# Patient Record
Sex: FEMALE | Race: WHITE | Employment: FULL TIME
[De-identification: names, ages, dates, MRNs, and addresses within clinical notes are randomized per-mention and may not be internally consistent; named-entity substitution may affect disease eponyms.]

---

## 2017-02-06 ENCOUNTER — OFFICE VISIT (OUTPATIENT)
Dept: FAMILY MEDICINE CLINIC | Facility: CLINIC | Age: 42
End: 2017-02-06

## 2017-02-06 ENCOUNTER — HOSPITAL ENCOUNTER (OUTPATIENT)
Age: 42
Setting detail: SPECIMEN
Discharge: HOME | End: 2017-02-06
Payer: MEDICARE

## 2017-02-06 VITALS
OXYGEN SATURATION: 100 % | RESPIRATION RATE: 18 BRPM | SYSTOLIC BLOOD PRESSURE: 110 MMHG | WEIGHT: 153 LBS | DIASTOLIC BLOOD PRESSURE: 72 MMHG | HEART RATE: 80 BPM | BODY MASS INDEX: 26.12 KG/M2 | TEMPERATURE: 98.2 F | HEIGHT: 64 IN

## 2017-02-06 DIAGNOSIS — Z82.61 FAMILY HISTORY OF RHEUMATOID ARTHRITIS: ICD-10-CM

## 2017-02-06 DIAGNOSIS — Z13.9 SCREENING: ICD-10-CM

## 2017-02-06 DIAGNOSIS — R76.8 ELEVATED RHEUMATOID FACTOR: ICD-10-CM

## 2017-02-06 DIAGNOSIS — M06.39 RHEUMATOID NODULE OF MULTIPLE SITES (HCC): ICD-10-CM

## 2017-02-06 DIAGNOSIS — M25.541 ARTHRALGIA OF BOTH HANDS: Primary | ICD-10-CM

## 2017-02-06 DIAGNOSIS — M25.542 ARTHRALGIA OF BOTH HANDS: Primary | ICD-10-CM

## 2017-02-06 LAB
ALBUMIN SERPL-MCNC: 4.2 G/DL (ref 3.5–5.2)
ALBUMIN/GLOBULIN RATIO: 1.2 (ref 1–2.5)
ALP BLD-CCNC: 47 U/L (ref 35–104)
ALT SERPL-CCNC: 86 U/L (ref 5–33)
AST SERPL-CCNC: 78 U/L
BILIRUB SERPL-MCNC: 0.3 MG/DL (ref 0.3–1.2)
BILIRUBIN DIRECT: 0.09 MG/DL
BILIRUBIN, INDIRECT: 0.21 MG/DL (ref 0–1)
CHOLESTEROL/HDL RATIO: 2.9
CHOLESTEROL: 170 MG/DL
GLOBULIN: ABNORMAL G/DL (ref 1.5–3.8)
HDLC SERPL-MCNC: 58 MG/DL
LDL CHOLESTEROL: 65 MG/DL (ref 0–130)
RHEUMATOID FACTOR: 76.1 IU/ML
TOTAL PROTEIN: 7.6 G/DL (ref 6.4–8.3)
TRIGL SERPL-MCNC: 236 MG/DL
VLDLC SERPL CALC-MCNC: ABNORMAL MG/DL (ref 1–30)

## 2017-02-06 PROCEDURE — 99214 OFFICE O/P EST MOD 30 MIN: CPT | Performed by: NURSE PRACTITIONER

## 2017-02-06 ASSESSMENT — PATIENT HEALTH QUESTIONNAIRE - PHQ9
SUM OF ALL RESPONSES TO PHQ QUESTIONS 1-9: 0
1. LITTLE INTEREST OR PLEASURE IN DOING THINGS: 0
2. FEELING DOWN, DEPRESSED OR HOPELESS: 0
SUM OF ALL RESPONSES TO PHQ9 QUESTIONS 1 & 2: 0

## 2017-02-06 ASSESSMENT — ENCOUNTER SYMPTOMS
RHINORRHEA: 0
EYE DISCHARGE: 0
SINUS PRESSURE: 0
CHEST TIGHTNESS: 0
SHORTNESS OF BREATH: 0
COUGH: 0
DIARRHEA: 0
BLOOD IN STOOL: 0
ABDOMINAL PAIN: 0
CONSTIPATION: 0

## 2017-02-13 ENCOUNTER — OFFICE VISIT (OUTPATIENT)
Dept: FAMILY MEDICINE CLINIC | Facility: CLINIC | Age: 42
End: 2017-02-13

## 2017-02-13 VITALS
RESPIRATION RATE: 20 BRPM | HEART RATE: 97 BPM | BODY MASS INDEX: 25.92 KG/M2 | TEMPERATURE: 98.3 F | WEIGHT: 151 LBS | SYSTOLIC BLOOD PRESSURE: 116 MMHG | DIASTOLIC BLOOD PRESSURE: 68 MMHG

## 2017-02-13 DIAGNOSIS — M06.39 RHEUMATOID NODULE OF MULTIPLE SITES (HCC): Primary | ICD-10-CM

## 2017-02-13 DIAGNOSIS — R76.8 ELEVATED RHEUMATOID FACTOR: ICD-10-CM

## 2017-02-13 DIAGNOSIS — B18.2 CHRONIC HEPATITIS C WITHOUT HEPATIC COMA (HCC): ICD-10-CM

## 2017-02-13 DIAGNOSIS — D23.30 CYST, DERMOID, FACE: ICD-10-CM

## 2017-02-13 DIAGNOSIS — R79.89 ELEVATED LFTS: ICD-10-CM

## 2017-02-13 PROCEDURE — 99214 OFFICE O/P EST MOD 30 MIN: CPT | Performed by: NURSE PRACTITIONER

## 2017-02-13 ASSESSMENT — ENCOUNTER SYMPTOMS
SINUS PRESSURE: 0
DIARRHEA: 0
EYE DISCHARGE: 0
ABDOMINAL PAIN: 0
CHEST TIGHTNESS: 0
BLOOD IN STOOL: 0
SHORTNESS OF BREATH: 0
RHINORRHEA: 0
CONSTIPATION: 0
COUGH: 0

## 2017-02-13 ASSESSMENT — PATIENT HEALTH QUESTIONNAIRE - PHQ9
2. FEELING DOWN, DEPRESSED OR HOPELESS: 0
SUM OF ALL RESPONSES TO PHQ9 QUESTIONS 1 & 2: 0
1. LITTLE INTEREST OR PLEASURE IN DOING THINGS: 0
SUM OF ALL RESPONSES TO PHQ QUESTIONS 1-9: 0

## 2017-02-27 PROBLEM — R74.8 ELEVATED LIVER ENZYMES: Status: ACTIVE | Noted: 2017-01-01

## 2017-03-06 ENCOUNTER — OFFICE VISIT (OUTPATIENT)
Dept: GASTROENTEROLOGY | Facility: CLINIC | Age: 42
End: 2017-03-06

## 2017-03-06 VITALS
HEIGHT: 64 IN | DIASTOLIC BLOOD PRESSURE: 73 MMHG | TEMPERATURE: 98.9 F | WEIGHT: 153 LBS | HEART RATE: 98 BPM | OXYGEN SATURATION: 98 % | BODY MASS INDEX: 26.12 KG/M2 | SYSTOLIC BLOOD PRESSURE: 119 MMHG

## 2017-03-06 DIAGNOSIS — B18.2 CHRONIC HEPATITIS C WITHOUT HEPATIC COMA (HCC): Primary | ICD-10-CM

## 2017-03-06 DIAGNOSIS — R74.8 ELEVATED LIVER ENZYMES: ICD-10-CM

## 2017-03-06 PROCEDURE — 99203 OFFICE O/P NEW LOW 30 MIN: CPT | Performed by: INTERNAL MEDICINE

## 2017-03-06 ASSESSMENT — ENCOUNTER SYMPTOMS
RECTAL PAIN: 0
COUGH: 0
ABDOMINAL PAIN: 0
CONSTIPATION: 0
EYES NEGATIVE: 1
DIARRHEA: 0
ABDOMINAL DISTENTION: 0
ANAL BLEEDING: 0
NAUSEA: 0
VOMITING: 0
BACK PAIN: 0
CHOKING: 0
BLOOD IN STOOL: 0
SHORTNESS OF BREATH: 0

## 2017-03-13 ENCOUNTER — HOSPITAL ENCOUNTER (OUTPATIENT)
Age: 42
Setting detail: SPECIMEN
Discharge: HOME | End: 2017-03-13
Payer: MEDICARE

## 2017-03-13 DIAGNOSIS — B18.2 CHRONIC HEPATITIS C WITHOUT HEPATIC COMA (HCC): ICD-10-CM

## 2017-03-13 DIAGNOSIS — R74.8 ELEVATED LIVER ENZYMES: ICD-10-CM

## 2017-03-13 LAB
-: NORMAL
ALBUMIN SERPL-MCNC: 4.4 G/DL (ref 3.5–5.2)
ALBUMIN/GLOBULIN RATIO: 1.3 (ref 1–2.5)
ALP BLD-CCNC: 47 U/L (ref 35–104)
ALT SERPL-CCNC: 56 U/L (ref 5–33)
AST SERPL-CCNC: 48 U/L
BILIRUB SERPL-MCNC: 0.45 MG/DL (ref 0.3–1.2)
BILIRUBIN DIRECT: 0.12 MG/DL
BILIRUBIN, INDIRECT: 0.33 MG/DL (ref 0–1)
GLOBULIN: ABNORMAL G/DL (ref 1.5–3.8)
REASON FOR REJECTION: NORMAL
TOTAL PROTEIN: 7.9 G/DL (ref 6.4–8.3)
ZZ NTE CLEAN UP: ORDERED TEST: NORMAL
ZZ NTE WITH NAME CLEAN UP: SPECIMEN SOURCE: NORMAL

## 2017-03-17 LAB
DIRECT EXAM: ABNORMAL
HCV QUANTITATIVE: NORMAL
HEPATITIS C GENOTYPE: NORMAL
Lab: ABNORMAL
SPECIMEN DESCRIPTION: ABNORMAL
STATUS: ABNORMAL

## 2017-03-27 ENCOUNTER — HOSPITAL ENCOUNTER (OUTPATIENT)
Age: 42
Setting detail: SPECIMEN
Discharge: HOME | End: 2017-03-27
Payer: MEDICARE

## 2017-03-27 DIAGNOSIS — Z13.9 SCREENING: ICD-10-CM

## 2017-03-29 LAB
ALANINE AMINOTRANSFERASE, FIBROMETER: 45 U/L (ref 5–40)
ALPHA-2-MACROGLOBULIN, FIBROMETER: 249 MG/DL (ref 131–293)
ASPARTATE AMINOTRANSFERASE, FIBROMETER: 42 U/L (ref 9–40)
CIRRHOMETER PATIENT SCORE: 0.01
EER FIBROMETER REPORT: ABNORMAL
FIBROMETER INTERPRETATION: ABNORMAL
FIBROMETER PATIENT SCORE: 0.22
FIBROMETER PLATELET COUNT: 156
FIBROMETER PROTHROMBIN INDEX: 111 % (ref 90–120)
FIBROSIS METAVIR CLASSIFICATION: ABNORMAL
GAMMA GLUTAMYL TRANSFERASE, FIBROMETER: 17 U/L (ref 7–33)
INFLAMETER METAVIR CLASSIFICATION: ABNORMAL
INFLAMETER PATIENT SCORE: 0.35
UREA NITROGEN, FIBROMETER: 12 MG/DL (ref 7–20)

## 2017-04-03 ENCOUNTER — OFFICE VISIT (OUTPATIENT)
Dept: GASTROENTEROLOGY | Age: 42
End: 2017-04-03
Payer: MEDICARE

## 2017-04-03 VITALS
OXYGEN SATURATION: 97 % | WEIGHT: 150 LBS | BODY MASS INDEX: 25.61 KG/M2 | DIASTOLIC BLOOD PRESSURE: 75 MMHG | HEART RATE: 109 BPM | TEMPERATURE: 99 F | SYSTOLIC BLOOD PRESSURE: 131 MMHG | HEIGHT: 64 IN

## 2017-04-03 DIAGNOSIS — R74.8 ELEVATED LIVER ENZYMES: Primary | ICD-10-CM

## 2017-04-03 DIAGNOSIS — B18.2 CHRONIC HEPATITIS C WITHOUT HEPATIC COMA (HCC): ICD-10-CM

## 2017-04-03 PROCEDURE — 99213 OFFICE O/P EST LOW 20 MIN: CPT | Performed by: INTERNAL MEDICINE

## 2017-04-03 ASSESSMENT — ENCOUNTER SYMPTOMS
RECTAL PAIN: 0
BLOOD IN STOOL: 0
COUGH: 0
VOMITING: 0
NAUSEA: 0
CONSTIPATION: 0
CHOKING: 0
ANAL BLEEDING: 0
ABDOMINAL PAIN: 0
SHORTNESS OF BREATH: 0
DIARRHEA: 0
ABDOMINAL DISTENTION: 0
EYES NEGATIVE: 1
BACK PAIN: 0

## 2017-04-10 ENCOUNTER — HOSPITAL ENCOUNTER (OUTPATIENT)
Age: 42
Setting detail: SPECIMEN
Discharge: HOME | End: 2017-04-10
Payer: MEDICARE

## 2017-04-10 DIAGNOSIS — R74.8 ELEVATED LIVER ENZYMES: ICD-10-CM

## 2017-04-10 DIAGNOSIS — B18.2 CHRONIC HEPATITIS C WITHOUT HEPATIC COMA (HCC): ICD-10-CM

## 2017-04-10 LAB
ABSOLUTE EOS #: 0.1 K/UL (ref 0–0.4)
ABSOLUTE LYMPH #: 2 K/UL (ref 1–4.8)
ABSOLUTE MONO #: 0.7 K/UL (ref 0.1–1.2)
ALBUMIN SERPL-MCNC: 4.1 G/DL (ref 3.5–5.2)
ALBUMIN/GLOBULIN RATIO: 1.2 (ref 1–2.5)
ALP BLD-CCNC: 44 U/L (ref 35–104)
ALT SERPL-CCNC: 40 U/L (ref 5–33)
AMPHETAMINE SCREEN URINE: NEGATIVE
ANION GAP SERPL CALCULATED.3IONS-SCNC: 14 MMOL/L (ref 9–17)
AST SERPL-CCNC: 38 U/L
BARBITURATE SCREEN URINE: NEGATIVE
BASOPHILS # BLD: 0 % (ref 0–2)
BASOPHILS ABSOLUTE: 0 K/UL (ref 0–0.2)
BENZODIAZEPINE SCREEN, URINE: NEGATIVE
BILIRUB SERPL-MCNC: 0.39 MG/DL (ref 0.3–1.2)
BILIRUBIN DIRECT: 0.12 MG/DL
BILIRUBIN, INDIRECT: 0.27 MG/DL (ref 0–1)
BUN BLDV-MCNC: 9 MG/DL (ref 6–20)
BUPRENORPHINE URINE: NORMAL
CALCIUM SERPL-MCNC: 8.8 MG/DL (ref 8.6–10.4)
CANNABINOID SCREEN URINE: NEGATIVE
CHLORIDE BLD-SCNC: 102 MMOL/L (ref 98–107)
CO2: 23 MMOL/L (ref 20–31)
COCAINE METABOLITE, URINE: NEGATIVE
CREAT SERPL-MCNC: 0.63 MG/DL (ref 0.5–0.9)
DIFFERENTIAL TYPE: ABNORMAL
EOSINOPHILS RELATIVE PERCENT: 1 % (ref 1–4)
FERRITIN: 101 UG/L (ref 13–150)
GFR AFRICAN AMERICAN: >60 ML/MIN
GFR NON-AFRICAN AMERICAN: >60 ML/MIN
GFR SERPL CREATININE-BSD FRML MDRD: ABNORMAL ML/MIN/{1.73_M2}
GFR SERPL CREATININE-BSD FRML MDRD: ABNORMAL ML/MIN/{1.73_M2}
GLUCOSE BLD-MCNC: 96 MG/DL (ref 70–99)
HAV AB SERPL IA-ACNC: REACTIVE
HBV SURFACE AB TITR SER: 142 MIU/ML
HCT VFR BLD CALC: 43.5 % (ref 36–46)
HEMOGLOBIN: 14.7 G/DL (ref 12–16)
HEPATITIS B CORE TOTAL ANTIBODY: NONREACTIVE
HEPATITIS B SURFACE ANTIGEN: NONREACTIVE
IGG: 1500 MG/DL (ref 700–1600)
IGM: 116 MG/DL (ref 40–230)
IRON SATURATION: 26 % (ref 20–55)
IRON: 83 UG/DL (ref 37–145)
LYMPHOCYTES # BLD: 19 % (ref 24–44)
MCH RBC QN AUTO: 31.7 PG (ref 26–34)
MCHC RBC AUTO-ENTMCNC: 33.8 G/DL (ref 31–37)
MCV RBC AUTO: 93.8 FL (ref 80–100)
MDMA URINE: NORMAL
METHADONE SCREEN, URINE: NEGATIVE
METHAMPHETAMINE, URINE: NORMAL
MONOCYTES # BLD: 7 % (ref 2–11)
OPIATES, URINE: NEGATIVE
OXYCODONE SCREEN URINE: NEGATIVE
PDW BLD-RTO: 14.3 % (ref 12.5–15.4)
PHENCYCLIDINE, URINE: NEGATIVE
PLATELET # BLD: 168 K/UL (ref 140–450)
PLATELET ESTIMATE: ABNORMAL
PMV BLD AUTO: 10.7 FL (ref 6–12)
POTASSIUM SERPL-SCNC: 4.2 MMOL/L (ref 3.7–5.3)
PROPOXYPHENE, URINE: NORMAL
RBC # BLD: 4.64 M/UL (ref 4–5.2)
RBC # BLD: ABNORMAL 10*6/UL
SEG NEUTROPHILS: 73 % (ref 36–66)
SEGMENTED NEUTROPHILS ABSOLUTE COUNT: 7.5 K/UL (ref 1.8–7.7)
SODIUM BLD-SCNC: 139 MMOL/L (ref 135–144)
TEST INFORMATION: NORMAL
TOTAL IRON BINDING CAPACITY: 315 UG/DL (ref 250–450)
TOTAL PROTEIN: 7.6 G/DL (ref 6.4–8.3)
TRICYCLIC ANTIDEPRESSANTS, UR: NORMAL
UNSATURATED IRON BINDING CAPACITY: 232 UG/DL (ref 112–347)
WBC # BLD: 10.3 K/UL (ref 3.5–11)
WBC # BLD: ABNORMAL 10*3/UL

## 2017-04-11 LAB — ANTI-NUCLEAR ANTIBODY (ANA): NEGATIVE

## 2017-04-12 LAB
LIVER-KIDNEY MICROSOMAL AB: NORMAL
MITOCHONDRIAL ANTIBODY: 9.6 UNITS (ref 0–20)
SMOOTH MUSCLE ANTIBODY: NORMAL

## 2017-04-14 ENCOUNTER — TELEPHONE (OUTPATIENT)
Dept: GASTROENTEROLOGY | Age: 42
End: 2017-04-14

## 2017-06-12 ENCOUNTER — TELEPHONE (OUTPATIENT)
Dept: GASTROENTEROLOGY | Age: 42
End: 2017-06-12

## 2017-06-21 ENCOUNTER — TELEPHONE (OUTPATIENT)
Dept: GASTROENTEROLOGY | Age: 42
End: 2017-06-21

## 2020-06-16 ENCOUNTER — TELEMEDICINE (OUTPATIENT)
Dept: FAMILY MEDICINE CLINIC | Age: 45
End: 2020-06-16
Payer: MEDICARE

## 2020-06-16 PROCEDURE — G8427 DOCREV CUR MEDS BY ELIG CLIN: HCPCS | Performed by: NURSE PRACTITIONER

## 2020-06-16 PROCEDURE — 99203 OFFICE O/P NEW LOW 30 MIN: CPT | Performed by: NURSE PRACTITIONER

## 2020-06-16 ASSESSMENT — ENCOUNTER SYMPTOMS
COUGH: 0
CONSTIPATION: 0
EYE DISCHARGE: 0
BLOOD IN STOOL: 0
RHINORRHEA: 0
ABDOMINAL PAIN: 0
CHEST TIGHTNESS: 0
DIARRHEA: 0
SHORTNESS OF BREATH: 0
SINUS PRESSURE: 0

## 2020-06-16 NOTE — PROGRESS NOTES
arthralgias and myalgias. Skin: Negative for rash. Neurological: Positive for light-headedness. Negative for dizziness and headaches. Psychiatric/Behavioral: Negative for dysphoric mood and self-injury. Prior to Visit Medications    Not on File     Social- patient smokes    Past Medical History:   Diagnosis Date    Depression 2014    Elevated liver enzymes     Hepatitis C    ,   Past Surgical History:   Procedure Laterality Date     SECTION   &              [] OTHER:    Constitutional: [x] Appears well-developed and well-nourished [] No apparent distress                            [] Abnormal-   Mental status  [x] Alert and awake  [x] Oriented to person/place/time [x]Able to follow commands       Eyes:  EOM    [x]  Normal  [] Abnormal-  Sclera  [x]  Normal  [] Abnormal -         Discharge [x]  None visible  [] Abnormal -     HENT:   [x] Normocephalic, atraumatic.   [] Abnormal   [] Mouth/Throat: Mucous membranes are moist.      External Ears [x] Normal  [] Abnormal-      Neck: [x] No visualized mass      Pulmonary/Chest: [x] Respiratory effort normal.  [] No visualized signs of difficulty breathing or respiratory distress        [] Abnormal-      Musculoskeletal:   [] Normal gait with no signs of ataxia         [x] Normal range of motion of neck        [] Abnormal-   [] Motor grossly intact in visible upper extremities    [] Motor grossly intact in visible lower extremities        Neurological:        [x] No Facial Asymmetry (Cranial nerve 7 motor function) (limited exam to video visit)                       [x] No gaze palsy        [] Abnormal-         Skin:                     [x] No significant exanthematous lesions or discoloration noted on facial skin         [] Abnormal-                                  Psychiatric:           [x] Normal Affect [] No Hallucinations        [] Abnormal- [] Normal Mood  [] Anxious appearing    [] Depressed appearing  [] Confused appearing

## 2020-06-23 ENCOUNTER — HOSPITAL ENCOUNTER (OUTPATIENT)
Age: 45
Discharge: HOME | End: 2020-06-23
Payer: MEDICARE

## 2020-06-23 LAB
ALBUMIN SERPL-MCNC: 4 G/DL (ref 3.5–5.2)
ALBUMIN/GLOBULIN RATIO: 1.3 (ref 1–2.5)
ALP BLD-CCNC: 51 U/L (ref 35–104)
ALT SERPL-CCNC: 37 U/L (ref 5–33)
ANION GAP SERPL CALCULATED.3IONS-SCNC: 10 MMOL/L (ref 9–17)
AST SERPL-CCNC: 34 U/L
BILIRUB SERPL-MCNC: 0.7 MG/DL (ref 0.3–1.2)
BUN BLDV-MCNC: 15 MG/DL (ref 6–20)
BUN/CREAT BLD: ABNORMAL (ref 9–20)
CALCIUM SERPL-MCNC: 8.9 MG/DL (ref 8.6–10.4)
CHLORIDE BLD-SCNC: 104 MMOL/L (ref 98–107)
CHOLESTEROL/HDL RATIO: 3.2
CHOLESTEROL: 166 MG/DL
CO2: 26 MMOL/L (ref 20–31)
CREAT SERPL-MCNC: 0.69 MG/DL (ref 0.5–0.9)
GFR AFRICAN AMERICAN: >60 ML/MIN
GFR NON-AFRICAN AMERICAN: >60 ML/MIN
GFR SERPL CREATININE-BSD FRML MDRD: ABNORMAL ML/MIN/{1.73_M2}
GFR SERPL CREATININE-BSD FRML MDRD: ABNORMAL ML/MIN/{1.73_M2}
GLUCOSE BLD-MCNC: 96 MG/DL (ref 70–99)
HCT VFR BLD CALC: 47.8 % (ref 36.3–47.1)
HDLC SERPL-MCNC: 52 MG/DL
HEMOGLOBIN: 15.5 G/DL (ref 11.9–15.1)
HIV AG/AB: NONREACTIVE
LDL CHOLESTEROL: 90 MG/DL (ref 0–130)
MCH RBC QN AUTO: 31.4 PG (ref 25.2–33.5)
MCHC RBC AUTO-ENTMCNC: 32.4 G/DL (ref 28.4–34.8)
MCV RBC AUTO: 96.8 FL (ref 82.6–102.9)
NRBC AUTOMATED: 0 PER 100 WBC
PDW BLD-RTO: 13.2 % (ref 11.8–14.4)
PLATELET # BLD: 195 K/UL (ref 138–453)
PMV BLD AUTO: 11.9 FL (ref 8.1–13.5)
POTASSIUM SERPL-SCNC: 4.2 MMOL/L (ref 3.7–5.3)
RBC # BLD: 4.94 M/UL (ref 3.95–5.11)
SODIUM BLD-SCNC: 140 MMOL/L (ref 135–144)
TOTAL PROTEIN: 7.2 G/DL (ref 6.4–8.3)
TRIGL SERPL-MCNC: 120 MG/DL
TSH SERPL DL<=0.05 MIU/L-ACNC: 3.88 MIU/L (ref 0.3–5)
VLDLC SERPL CALC-MCNC: NORMAL MG/DL (ref 1–30)
WBC # BLD: 8.9 K/UL (ref 3.5–11.3)

## 2020-06-23 PROCEDURE — 36415 COLL VENOUS BLD VENIPUNCTURE: CPT

## 2020-06-23 PROCEDURE — 80061 LIPID PANEL: CPT

## 2020-06-23 PROCEDURE — 84443 ASSAY THYROID STIM HORMONE: CPT

## 2020-06-23 PROCEDURE — 87389 HIV-1 AG W/HIV-1&-2 AB AG IA: CPT

## 2020-06-23 PROCEDURE — 80053 COMPREHEN METABOLIC PANEL: CPT

## 2020-06-23 PROCEDURE — 85027 COMPLETE CBC AUTOMATED: CPT

## 2020-07-06 ENCOUNTER — HOSPITAL ENCOUNTER (OUTPATIENT)
Age: 45
Discharge: HOME | End: 2020-07-06
Payer: MEDICARE

## 2020-07-06 LAB
IRON SATURATION: 80 % (ref 20–55)
IRON: 217 UG/DL (ref 37–145)
THYROXINE, FREE: 1.02 NG/DL (ref 0.93–1.7)
TOTAL IRON BINDING CAPACITY: 270 UG/DL (ref 250–450)
UNSATURATED IRON BINDING CAPACITY: 53 UG/DL (ref 112–347)
VITAMIN B-12: 451 PG/ML (ref 232–1245)
VITAMIN D 25-HYDROXY: 21.9 NG/ML (ref 30–100)

## 2020-07-06 PROCEDURE — 82607 VITAMIN B-12: CPT

## 2020-07-06 PROCEDURE — 82306 VITAMIN D 25 HYDROXY: CPT

## 2020-07-06 PROCEDURE — 36415 COLL VENOUS BLD VENIPUNCTURE: CPT

## 2020-07-06 PROCEDURE — 84439 ASSAY OF FREE THYROXINE: CPT

## 2020-07-06 PROCEDURE — 86800 THYROGLOBULIN ANTIBODY: CPT

## 2020-07-06 PROCEDURE — 83540 ASSAY OF IRON: CPT

## 2020-07-06 PROCEDURE — 83550 IRON BINDING TEST: CPT

## 2020-07-06 PROCEDURE — 86376 MICROSOMAL ANTIBODY EACH: CPT

## 2020-07-07 LAB
THYROGLOBULIN AB: <20 IU/ML (ref 0–40)
THYROID PEROXIDASE (TPO) AB: 412 IU/ML (ref 0–35)

## 2020-07-10 ENCOUNTER — TELEPHONE (OUTPATIENT)
Dept: ONCOLOGY | Age: 45
End: 2020-07-10

## 2020-07-20 ENCOUNTER — HOSPITAL ENCOUNTER (OUTPATIENT)
Dept: ULTRASOUND IMAGING | Age: 45
Discharge: HOME | End: 2020-07-22
Payer: MEDICARE

## 2020-07-20 PROCEDURE — 76536 US EXAM OF HEAD AND NECK: CPT

## 2020-07-23 RX ORDER — LEVOTHYROXINE SODIUM 0.03 MG/1
25 TABLET ORAL DAILY
Qty: 90 TABLET | Refills: 1 | Status: SHIPPED | OUTPATIENT
Start: 2020-07-23 | End: 2020-08-03

## 2020-07-27 ENCOUNTER — HOSPITAL ENCOUNTER (OUTPATIENT)
Facility: MEDICAL CENTER | Age: 45
Discharge: HOME | End: 2020-07-27
Payer: MEDICARE

## 2020-07-27 ENCOUNTER — INITIAL CONSULT (OUTPATIENT)
Dept: ONCOLOGY | Age: 45
End: 2020-07-27
Payer: MEDICARE

## 2020-07-27 ENCOUNTER — TELEPHONE (OUTPATIENT)
Dept: ONCOLOGY | Age: 45
End: 2020-07-27

## 2020-07-27 ENCOUNTER — TELEPHONE (OUTPATIENT)
Dept: GASTROENTEROLOGY | Age: 45
End: 2020-07-27

## 2020-07-27 VITALS
HEART RATE: 76 BPM | WEIGHT: 153.8 LBS | RESPIRATION RATE: 16 BRPM | HEIGHT: 64 IN | SYSTOLIC BLOOD PRESSURE: 120 MMHG | DIASTOLIC BLOOD PRESSURE: 77 MMHG | BODY MASS INDEX: 26.26 KG/M2 | TEMPERATURE: 98.6 F

## 2020-07-27 DIAGNOSIS — D75.1 POLYCYTHEMIA, SECONDARY: ICD-10-CM

## 2020-07-27 DIAGNOSIS — E83.19 IRON EXCESS: ICD-10-CM

## 2020-07-27 LAB
ABSOLUTE EOS #: 0.07 K/UL (ref 0–0.44)
ABSOLUTE IMMATURE GRANULOCYTE: 0.02 K/UL (ref 0–0.3)
ABSOLUTE LYMPH #: 1.81 K/UL (ref 1.1–3.7)
ABSOLUTE MONO #: 0.73 K/UL (ref 0.1–1.2)
BASOPHILS # BLD: 0 % (ref 0–2)
BASOPHILS ABSOLUTE: 0.03 K/UL (ref 0–0.2)
DIFFERENTIAL TYPE: ABNORMAL
EOSINOPHILS RELATIVE PERCENT: 1 % (ref 1–4)
FERRITIN: 161 UG/L (ref 13–150)
HCT VFR BLD CALC: 50.1 % (ref 36.3–47.1)
HEMOGLOBIN: 16 G/DL (ref 11.9–15.1)
IMMATURE GRANULOCYTES: 0 %
LYMPHOCYTES # BLD: 22 % (ref 24–43)
MCH RBC QN AUTO: 31.1 PG (ref 25.2–33.5)
MCHC RBC AUTO-ENTMCNC: 31.9 G/DL (ref 28.4–34.8)
MCV RBC AUTO: 97.3 FL (ref 82.6–102.9)
MONOCYTES # BLD: 9 % (ref 3–12)
NRBC AUTOMATED: 0 PER 100 WBC
PDW BLD-RTO: 13 % (ref 11.8–14.4)
PLATELET # BLD: 196 K/UL (ref 138–453)
PLATELET ESTIMATE: ABNORMAL
PMV BLD AUTO: 12 FL (ref 8.1–13.5)
RBC # BLD: 5.15 M/UL (ref 3.95–5.11)
RBC # BLD: ABNORMAL 10*6/UL
SEG NEUTROPHILS: 68 % (ref 36–65)
SEGMENTED NEUTROPHILS ABSOLUTE COUNT: 5.66 K/UL (ref 1.5–8.1)
WBC # BLD: 8.3 K/UL (ref 3.5–11.3)
WBC # BLD: ABNORMAL 10*3/UL

## 2020-07-27 PROCEDURE — 99202 OFFICE O/P NEW SF 15 MIN: CPT

## 2020-07-27 PROCEDURE — 99244 OFF/OP CNSLTJ NEW/EST MOD 40: CPT | Performed by: INTERNAL MEDICINE

## 2020-07-27 PROCEDURE — 82728 ASSAY OF FERRITIN: CPT

## 2020-07-27 PROCEDURE — 36415 COLL VENOUS BLD VENIPUNCTURE: CPT

## 2020-07-27 PROCEDURE — G8419 CALC BMI OUT NRM PARAM NOF/U: HCPCS | Performed by: INTERNAL MEDICINE

## 2020-07-27 PROCEDURE — G8427 DOCREV CUR MEDS BY ELIG CLIN: HCPCS | Performed by: INTERNAL MEDICINE

## 2020-07-27 PROCEDURE — 85025 COMPLETE CBC W/AUTO DIFF WBC: CPT

## 2020-07-27 NOTE — TELEPHONE ENCOUNTER
Received a call from [facility] @ the [facility]. [facility] to make appointment from new referral.  The patient can only do Monday appointment and was informed that this was more of an urgent referral.  Scheduled for 8/3/20 3 pm at [facility]. Writer thanked and call ended.

## 2020-07-27 NOTE — TELEPHONE ENCOUNTER
[facility] ARRIVES AMBULATORY FOR MD VISIT  DR Jennifer Yanes IN TO SEE PATIENT  ORDERS RECEIVED  REFER BACK TO DR CRANDALL  LABS TODAY  RV 6 WEEKS  LABS ON EXIT CDP FERRITIN  SPOKE W/DOROTA @DR CRANDALL'S OFFICE, PT IS SCHEDULED FOR 08/03/20 @3PM  MD VISIT 09/14/20 @10AM, WAS DUE BACK ON 09/07 BUT DUE TO HOLIDAY SCHEDULED FOR 7 WEEKS  AVS PRINTED AND GIVEN TO PATIENT WITH INSTRUCTIONS  PATIENT DISCHARGED AMBULATORY

## 2020-07-27 NOTE — PROGRESS NOTES
DIAGNOSIS:   Polycythemia  Goiter, likely hashimoto thyroiditis   Hep C    CURRENT THERAPY:  Plan for work up    BRIEF CASE HISTORY:   Rafa Reyez is a very pleasant 39 y.o. female who is referred to us for polycythemia. She denies any previous history of polycythemia or hemachromatosis. She does smoke a pack daily and has for the last 30 years. She has goiter and reports medication makes her sleepy and she hasn't been taking. She complains of throat swelling and soreness. She has been diagnosed with hepatitis C, unable to receive treatment due to insurance. She denies history of illegal drug use. PAST MEDICAL HISTORY: has a past medical history of Depression, Elevated liver enzymes, and Hepatitis C. PAST SURGICAL HISTORY: has a past surgical history that includes  section ( & ). CURRENT MEDICATIONS:  has a current medication list which includes the following prescription(s): levothyroxine. ALLERGIES:  has No Known Allergies. FAMILY HISTORY: Negative for any hematological or oncological conditions. SOCIAL HISTORY:  reports that she has been smoking cigarettes. She has a 25.00 pack-year smoking history. She has never used smokeless tobacco. She reports current alcohol use. She reports that she does not use drugs. REVIEW OF SYSTEMS:   General: No fever or night sweats. Weight is stable. ENT: No double or blurred vision, no tinnitus or hearing problem, no dysphagia; + sore throat with swelling   Respiratory: No chest pain, no shortness of breath, no cough or hemoptysis. Cardiovascular: Denies chest pain, PND or orthopnea. No L E swelling or palpitations. Gastrointestinal: No nausea or vomiting, abdominal pain, diarrhea or constipation. Genitourinary: Denies dysuria, hematuria, frequency, urgency or incontinence. Neurological: Denies headaches, decreased LOC, no sensory or motor focal deficits. Musculoskeletal: No arthralgia no back pain or joint swelling. Skin: There are no rashes or bleeding. Psychiatric: No anxiety, no depression. Endocrine: No diabetes or thyroid disease. Hematologic: No bleeding, no adenopathy. PHYSICAL EXAM: Shows a well appearing 39y.o.-year-old female who is not in pain or distress. Vital Signs: Blood pressure 120/77, pulse 76, temperature 98.6 °F (37 °C), temperature source Oral, resp. rate 16, height 5' 4\" (1.626 m), weight 153 lb 12.8 oz (69.8 kg), not currently breastfeeding. HEENT: Normocephalic and atraumatic. Pupils are equal, round, reactive to light and accommodation. Extraocular muscles are intact. Neck: Goiter Showed no JVD, no carotid bruit . Lungs: Clear to auscultation bilaterally. Heart: Regular without any murmur. Abdomen: Soft, nontender. No hepatosplenomegaly. Extremities: Lower extremities show no edema, clubbing, or cyanosis. Breasts: Examination not done today. Neuro exam: intact cranial nerves bilaterally no motor or sensory deficit, gait is normal. Lymphatic: no adenopathy appreciated in the supraclavicular, axillary, cervical or inguinal area    REVIEW OF LABORATORY DATA:   Lab Results   Component Value Date    WBC 8.9 06/23/2020    HGB 15.5 (H) 06/23/2020    HCT 47.8 (H) 06/23/2020    MCV 96.8 06/23/2020     06/23/2020       Chemistry        Component Value Date/Time     06/23/2020 1135    K 4.2 06/23/2020 1135     06/23/2020 1135    CO2 26 06/23/2020 1135    BUN 15 06/23/2020 1135    CREATININE 0.69 06/23/2020 1135        Component Value Date/Time    CALCIUM 8.9 06/23/2020 1135    ALKPHOS 51 06/23/2020 1135    AST 34 (H) 06/23/2020 1135    ALT 37 (H) 06/23/2020 1135    BILITOT 0.70 06/23/2020 1135        Lab Results   Component Value Date    IRON 217 (H) 07/06/2020    TIBC 270 07/06/2020    FERRITIN 101 04/10/2017         REVIEW OF RADIOLOGICAL RESULTS:     IMPRESSION:   1. Polycythemia  2. Goiter  3. Smoking addiction  4. HX hep C  5. Plan for lab work    PLAN:   1.  We reviewed her current and historical lab work, currently polycythemia with elevated iron saturation. 2. I explained plan for repeat lab work to include ferritin testing to rule out hemachromatosis. 3. She has long smoking history and I explained likely contribution to her high counts, her hepatitis may be underlying cause as well. 4. We discussed her throat pain does not correlate to thyroid location, I recommend alcohol free mouth rinse for gargling. 5. I counseled her on smoking cessation, she has started process on her own and will consider medications later. 6. I am referring her back to GI for discussion regarding hepatitis treatment options. 7. Return to review work up and recommendations 6 weeks.

## 2020-07-27 NOTE — LETTER
Mani Stone MD    2020     Micaela Jacobson, [address]   [address]    Dear Shayan Smyth : Thank you for referring Candido Moura, 1975, to me for evaluation. Below are the relevant portions of my assessment and plan of care. DIAGNOSIS:   Polycythemia  Goiter  Hep C    CURRENT THERAPY:  Plan for work up    BRIEF CASE HISTORY:   Candido Moura is a very pleasant 39 y.o. female who is referred to us for polycythemia. She denies any previous history of polycythemia or hemachromatosis. She does smoke a pack daily and has for the last 30 years. She has goiter and reports medication makes her sleepy and she hasn't been taking. She complains of throat swelling and soreness. She has been diagnosed with hepatitis C, unable to receive treatment due to insurance. She denies history of illegal drug use. PAST MEDICAL HISTORY: has a past medical history of Depression, Elevated liver enzymes, and Hepatitis C. PAST SURGICAL HISTORY: has a past surgical history that includes  section ( & ). CURRENT MEDICATIONS:  has a current medication list which includes the following prescription(s): levothyroxine. ALLERGIES:  has No Known Allergies. FAMILY HISTORY: Negative for any hematological or oncological conditions. SOCIAL HISTORY:  reports that she has been smoking cigarettes. She has a 25.00 pack-year smoking history. She has never used smokeless tobacco. She reports current alcohol use. She reports that she does not use drugs. REVIEW OF SYSTEMS:   General: No fever or night sweats. Weight is stable. ENT: No double or blurred vision, no tinnitus or hearing problem, no dysphagia; + sore throat with swelling   Respiratory: No chest pain, no shortness of breath, no cough or hemoptysis. Cardiovascular: Denies chest pain, PND or orthopnea. No L E swelling or palpitations.   Gastrointestinal: No nausea or vomiting, abdominal pain, diarrhea or constipation. Genitourinary: Denies dysuria, hematuria, frequency, urgency or incontinence. Neurological: Denies headaches, decreased LOC, no sensory or motor focal deficits. Musculoskeletal: No arthralgia no back pain or joint swelling. Skin: There are no rashes or bleeding. Psychiatric: No anxiety, no depression. Endocrine: No diabetes or thyroid disease. Hematologic: No bleeding, no adenopathy. PHYSICAL EXAM: Shows a well appearing 39y.o.-year-old female who is not in pain or distress. Vital Signs: Blood pressure 120/77, pulse 76, temperature 98.6 °F (37 °C), temperature source Oral, resp. rate 16, height 5' 4\" (1.626 m), weight 153 lb 12.8 oz (69.8 kg), not currently breastfeeding. HEENT: Normocephalic and atraumatic. Pupils are equal, round, reactive to light and accommodation. Extraocular muscles are intact. Neck: Goiter Showed no JVD, no carotid bruit . Lungs: Clear to auscultation bilaterally. Heart: Regular without any murmur. Abdomen: Soft, nontender. No hepatosplenomegaly. Extremities: Lower extremities show no edema, clubbing, or cyanosis. Breasts: Examination not done today.  Neuro exam: intact cranial nerves bilaterally no motor or sensory deficit, gait is normal. Lymphatic: no adenopathy appreciated in the supraclavicular, axillary, cervical or inguinal area    REVIEW OF LABORATORY DATA:   Lab Results   Component Value Date    WBC 8.9 06/23/2020    HGB 15.5 (H) 06/23/2020    HCT 47.8 (H) 06/23/2020    MCV 96.8 06/23/2020     06/23/2020       Chemistry        Component Value Date/Time     06/23/2020 1135    K 4.2 06/23/2020 1135     06/23/2020 1135    CO2 26 06/23/2020 1135    BUN 15 06/23/2020 1135    CREATININE 0.69 06/23/2020 1135        Component Value Date/Time    CALCIUM 8.9 06/23/2020 1135    ALKPHOS 51 06/23/2020 1135    AST 34 (H) 06/23/2020 1135    ALT 37 (H) 06/23/2020 1135    BILITOT 0.70 06/23/2020 1135        Lab Results   Component Value Date IRON 217 (H) 07/06/2020    TIBC 270 07/06/2020    FERRITIN 101 04/10/2017         REVIEW OF RADIOLOGICAL RESULTS:     IMPRESSION:   1. Polycythemia  2. Goiter  3. Smoking addiction  4. HX hep C  5. Plan for lab work    PLAN:   1. We reviewed her current and historical lab work, currently polycythemia with elevated iron saturation. 2. I explained plan for repeat lab work to include ferritin testing to rule out hemachromatosis. 3. She has long smoking history and I explained likely contribution to her high counts, her hepatitis may be underlying cause as well. 4. We discussed her throat pain does not correlate to thyroid location, I recommend alcohol free mouth rinse for gargling. 5. I counseled her on smoking cessation, she has started process on her own and will consider medications later. 6. I am referring her back to GI for discussion regarding hepatitis treatment options. 7. Return to review work up and recommendations 6 weeks. If you have questions, please do not hesitate to call me. I look forward to following Darlene along with you.     Sincerely,    Gwen Sevilla MD  Hematology/ Oncology  Cell [phone]

## 2020-07-30 ENCOUNTER — TELEPHONE (OUTPATIENT)
Dept: GASTROENTEROLOGY | Age: 45
End: 2020-07-30

## 2020-08-03 ENCOUNTER — OFFICE VISIT (OUTPATIENT)
Dept: GASTROENTEROLOGY | Age: 45
End: 2020-08-03
Payer: MEDICARE

## 2020-08-03 ENCOUNTER — HOSPITAL ENCOUNTER (OUTPATIENT)
Age: 45
Setting detail: SPECIMEN
Discharge: HOME | End: 2020-08-03
Payer: MEDICARE

## 2020-08-03 VITALS — WEIGHT: 156 LBS | TEMPERATURE: 97.4 F | BODY MASS INDEX: 26.78 KG/M2

## 2020-08-03 LAB
ETHANOL PERCENT: <0.01 %
ETHANOL: <10 MG/DL

## 2020-08-03 PROCEDURE — 99204 OFFICE O/P NEW MOD 45 MIN: CPT | Performed by: INTERNAL MEDICINE

## 2020-08-03 PROCEDURE — G8427 DOCREV CUR MEDS BY ELIG CLIN: HCPCS | Performed by: INTERNAL MEDICINE

## 2020-08-03 PROCEDURE — G8419 CALC BMI OUT NRM PARAM NOF/U: HCPCS | Performed by: INTERNAL MEDICINE

## 2020-08-03 PROCEDURE — 4004F PT TOBACCO SCREEN RCVD TLK: CPT | Performed by: INTERNAL MEDICINE

## 2020-08-03 ASSESSMENT — ENCOUNTER SYMPTOMS
VOICE CHANGE: 0
VOMITING: 0
RECTAL PAIN: 0
COUGH: 0
ABDOMINAL DISTENTION: 1
ALLERGIC/IMMUNOLOGIC NEGATIVE: 1
RESPIRATORY NEGATIVE: 1
EYES NEGATIVE: 1
DIARRHEA: 0
TROUBLE SWALLOWING: 0
SINUS PRESSURE: 0
WHEEZING: 0
ANAL BLEEDING: 0
BACK PAIN: 0
ABDOMINAL PAIN: 1
BLOOD IN STOOL: 0
CONSTIPATION: 1
CHOKING: 0
NAUSEA: 0
SORE THROAT: 0

## 2020-08-03 NOTE — PROGRESS NOTES
Reason for Referral:   Inés Scott MD  [facility], [address] [place] [address]    Chief Complaint   Patient presents with    Hepatitis C     Last seen in 2017 and recently referred for Polycythemia secondary to her iron. Also HEP C. Refered by Dr Jason Almaguer.  Fatigue     Patient feeling tired a lot and loosing hair and eyebrown           HISTORY OF PRESENT ILLNESS: Ms.Angie Miranda Soto is a 39 y.o. female , referred for evaluation of polycythemia and elevated iron levels. We saw her 3 years ago for hepatitis C. Treatment was denied by insurance at that time. She reports very rare alcohol use. She smokes. She denies using any drugs. She denies any abdominal symptoms. No family history of iron overload. Recently she was found to have thyroid inflammation. Past Medical,Family, and Social History reviewed and does not contribute to the patient presentingcondition. Patient's PMH/PSH,SH,PSYCH Hx, MEDs, ALLERGIES, and ROS were all reviewed and updated in the appropriate sections.     PAST MEDICAL HISTORY:  Past Medical History:   Diagnosis Date    Depression 2014    Elevated liver enzymes 2017    Hepatitis C        Past Surgical History:   Procedure Laterality Date     SECTION   &        CURRENT MEDICATIONS:    Current Outpatient Medications:     Corn Dextrin (CLEAR FIBER POWDER PO), Take by mouth, Disp: , Rfl:     ALLERGIES:   No Known Allergies    FAMILY HISTORY:       Problem Relation Age of Onset    COPD Mother     Emphysema Mother     Other Maternal Grandmother         breast cancer    Colon Cancer Maternal Uncle          SOCIAL HISTORY:   Social History     Socioeconomic History    Marital status:      Spouse name: Not on file    Number of children: Not on file    Years of education: Not on file    Highest education level: Not on file   Occupational History    Not on file   Social Needs    Financial resource strain: Not on file   [place] insecurity     Worry: Not on file     Inability: Not on file    Transportation needs     Medical: Not on file     Non-medical: Not on file   Tobacco Use    Smoking status: Current Every Day Smoker     Packs/day: 1.00     Years: 25.00     Pack years: 25.00     Types: Cigarettes    Smokeless tobacco: Never Used   Substance and Sexual Activity    Alcohol use: Yes     Alcohol/week: 0.0 standard drinks     Comment: social    Drug use: No    Sexual activity: Not on file   Lifestyle    Physical activity     Days per week: Not on file     Minutes per session: Not on file    Stress: Not on file   Relationships    Social connections     Talks on phone: Not on file     Gets together: Not on file     Attends Islam service: Not on file     Active member of club or organization: Not on file     Attends meetings of clubs or organizations: Not on file     Relationship status: Not on file    Intimate partner violence     Fear of current or ex partner: Not on file     Emotionally abused: Not on file     Physically abused: Not on file     Forced sexual activity: Not on file   Other Topics Concern    Not on file   Social History Narrative    Not on file       REVIEW OF SYSTEMS: A 12-point review of systemswas obtained and pertinent positives and negatives were enumerated above in the history of present illness. All other reviewed systems / symptoms were negative. Review of Systems   Constitutional: Positive for fatigue. Negative for appetite change and unexpected weight change. HENT: Negative. Negative for dental problem, postnasal drip, sinus pressure, sore throat, trouble swallowing and voice change. Eyes: Negative. Negative for visual disturbance. Respiratory: Negative. Negative for cough, choking and wheezing. Cardiovascular: Negative. Negative for chest pain, palpitations and leg swelling. Gastrointestinal: Positive for abdominal distention, abdominal pain and constipation.  Negative for anal bleeding, blood in stool, diarrhea, nausea, rectal pain and vomiting. Endocrine: Negative. Genitourinary: Negative. Negative for difficulty urinating. Musculoskeletal: Positive for arthralgias. Negative for back pain, gait problem and myalgias. Skin: Negative. Allergic/Immunologic: Negative. Negative for environmental allergies and food allergies. Neurological: Negative. Negative for dizziness, weakness, light-headedness, numbness and headaches. Hematological: Negative. Does not bruise/bleed easily. Psychiatric/Behavioral: Negative. Negative for sleep disturbance. The patient is not nervous/anxious. LABORATORY DATA: Reviewed  Lab Results   Component Value Date    WBC 8.3 07/27/2020    HGB 16.0 (H) 07/27/2020    HCT 50.1 (H) 07/27/2020    MCV 97.3 07/27/2020     07/27/2020     06/23/2020    K 4.2 06/23/2020     06/23/2020    CO2 26 06/23/2020    BUN 15 06/23/2020    CREATININE 0.69 06/23/2020    LABALBU 4.0 06/23/2020    BILITOT 0.70 06/23/2020    ALKPHOS 51 06/23/2020    AST 34 (H) 06/23/2020    ALT 37 (H) 06/23/2020         Lab Results   Component Value Date    RBC 5.15 (H) 07/27/2020    HGB 16.0 (H) 07/27/2020    MCV 97.3 07/27/2020    MCH 31.1 07/27/2020    MCHC 31.9 07/27/2020    RDW 13.0 07/27/2020    MPV 12.0 07/27/2020    BASOPCT 0 07/27/2020    LYMPHSABS 1.81 07/27/2020    MONOSABS 0.73 07/27/2020    NEUTROABS 5.66 07/27/2020    EOSABS 0.07 07/27/2020    BASOSABS 0.03 07/27/2020         DIAGNOSTIC TESTING:     Us Head Neck Soft Tissue Thyroid    Result Date: 7/20/2020  EXAMINATION: THYROID ULTRASOUND 7/20/2020 COMPARISON: None. HISTORY: ORDERING SYSTEM PROVIDED HISTORY: Anti-TPO antibodies present TECHNOLOGIST PROVIDED HISTORY: elevated tpo FINDINGS: Right thyroid lobe:  6.4 x 2.0 x 2.9 cm Left thyroid lobe:  4.0 x 1.1 x 1.0 cm Isthmus:  0.4 cm Thyroid Gland:  Thyroid gland demonstrates heterogeneous echotexture and increased vascularity.  Nodules: NODULE: Right 1 Size: 40 x 24 x 20 mm Location: Inferior 1. Composition:  Mixed cystic and solid (1) 2. Echogenicity:  Isoechoic (1) 3. Shape: Wider-than-tall (0) 4. Margins:  Smooth (0) 5. Echogenic foci:  None (0) ACR TI-RADS total points:  2 ACR TI-RADS risk category: TR2 Cervical lymphadenopathy: No abnormal lymph nodes in the imaged portions of the neck. 1.  Heterogeneous hypervascular thyroid suggestive of thyroiditis. 2.  Cystic nodule with benign features for which no further evaluation is necessary. Temp 97.4 °F (36.3 °C)   Wt 156 lb (70.8 kg)   BMI 26.78 kg/m²     PHYSICAL EXAMINATION: Vital signs reviewed per the nursing documentation. Body mass index is 26.78 kg/m². Physical Exam    I personally reviewed the nurse's notes and documentation and I agree with her notes. General: alert, appears stated age and cooperative Psych: Normal. and Alert and oriented, appropriate affect. . Normal affect. Mentation normal  HEENT: PERRLA. Clear conjunctivae and sclerae. Moist oral mucosae, no lesions or ulcers. The neck is supple, without lymphadenopathy or jugular venous distension. No masses. Normal thyroid. Cardiovascular: S1 S2 RRR no rubs or murmurs. Pulmonary: clear BL. No accessory muscle usage. Abdominal Exam: Soft, NT ND, no hepato or spleno megaly, +BS, no ascites. IMPRESSION: Ms. Chelly Veliz is a 39 y.o. female with polycythemia and elevated iron levels. Hepatitis C. We had extensive discussion with the patient. We will check hemochromatosis gene. In case we confirmed she has classical hemochromatosis she would benefit from phlebotomy. She continues to have regular menstrual periods although they are lighter than normal.  In case classical gene mutations are absent we will monitor her iron levels and in case they continue to increase that would indicate nonclassical form of iron overload for which she will still need phlebotomy. We will apply for hepatitis C treatment. Follow-up in 1 month. Spent 30 minutes providing patient education and counseling. Thank you for allowing me to participate in the care of Ms. Luis Carlos Gill. For any further questions please do not hesitate to contact me. I have reviewed and agree with the MA/LPN ROS. Note is dictated utilizing voice recognition software. Unfortunately this leads to occasional typographical errors. Please contact our office if you have any questions.     Hortencia Patino MD  [facility]  O: #[phone]

## 2020-08-04 ENCOUNTER — HOSPITAL ENCOUNTER (OUTPATIENT)
Age: 45
Setting detail: SPECIMEN
Discharge: HOME | End: 2020-08-04
Payer: MEDICARE

## 2020-08-04 LAB
AMPHETAMINE SCREEN URINE: NEGATIVE
BARBITURATE SCREEN URINE: NEGATIVE
BENZODIAZEPINE SCREEN, URINE: NEGATIVE
BUPRENORPHINE URINE: NORMAL
CANNABINOID SCREEN URINE: NEGATIVE
COCAINE METABOLITE, URINE: NEGATIVE
MDMA URINE: NORMAL
METHADONE SCREEN, URINE: NEGATIVE
METHAMPHETAMINE, URINE: NORMAL
OPIATES, URINE: NEGATIVE
OXYCODONE SCREEN URINE: NEGATIVE
PHENCYCLIDINE, URINE: NEGATIVE
PROPOXYPHENE, URINE: NORMAL
TEST INFORMATION: NORMAL
TRICYCLIC ANTIDEPRESSANTS, UR: NORMAL

## 2020-08-06 LAB
ALANINE AMINOTRANSFERASE, FIBROMETER: 44 U/L (ref 5–40)
ALPHA-2-MACROGLOBULIN, FIBROMETER: 300 MG/DL (ref 131–293)
ASPARTATE AMINOTRANSFERASE, FIBROMETER: 38 U/L (ref 9–40)
CIRRHOMETER PATIENT SCORE: 0.01
EER FIBROMETER REPORT: ABNORMAL
FIBROMETER INTERPRETATION: ABNORMAL
FIBROMETER PATIENT SCORE: 0.31
FIBROMETER PLATELET COUNT: 194
FIBROMETER PROTHROMBIN INDEX: 102 % (ref 90–120)
FIBROSIS METAVIR CLASSIFICATION: ABNORMAL
GAMMA GLUTAMYL TRANSFERASE, FIBROMETER: 13 U/L (ref 7–33)
INFLAMETER METAVIR CLASSIFICATION: ABNORMAL
INFLAMETER PATIENT SCORE: 0.42
UREA NITROGEN, FIBROMETER: 15 MG/DL (ref 7–20)

## 2020-08-07 LAB
DIRECT EXAM: ABNORMAL
DIRECT EXAM: ABNORMAL
HCV QUANTITATIVE: NORMAL
HEPATITIS C GENOTYPE: NORMAL
Lab: ABNORMAL
SPECIMEN DESCRIPTION: ABNORMAL

## 2020-08-10 ENCOUNTER — HOSPITAL ENCOUNTER (OUTPATIENT)
Dept: ULTRASOUND IMAGING | Age: 45
Discharge: HOME | End: 2020-08-12
Payer: MEDICARE

## 2020-08-10 PROCEDURE — 76705 ECHO EXAM OF ABDOMEN: CPT

## 2020-08-11 LAB
C282Y HEMOCHROMATOSIS MUT: NEGATIVE
H63D HEMOCHROMATOSIS MUT: NORMAL
HEMOCHROMATOSIS MUTATION INT: NORMAL
HEMOCHROMATOSIS SPECIMEN: NORMAL
S65C HEMOCHROMATOSIS MUT: NEGATIVE

## 2020-08-31 ENCOUNTER — NURSE TRIAGE (OUTPATIENT)
Dept: OTHER | Facility: CLINIC | Age: 45
End: 2020-08-31

## 2020-08-31 NOTE — TELEPHONE ENCOUNTER
Reason for Disposition   Patient wants to be seen    Answer Assessment - Initial Assessment Questions  1. LOCATION: \"Which ear is involved? \"      Both ears mainly right  2. ONSET: \"When did the ear start hurting\"       *No Answer*  3. SEVERITY: \"How bad is the pain? \"  (Scale 1-10; mild, moderate or severe)    - MILD (1-3): doesn't interfere with normal activities     - MODERATE (4-7): interferes with normal activities or awakens from sleep     - SEVERE (8-10): excruciating pain, unable to do any normal activities       8   4. URI SYMPTOMS: \" Do you have a runny nose or cough? \"      *No Answer*  5. FEVER: \"Do you have a fever? \" If so, ask: \"What is your temperature, how was it measured, and when did it start? \"      *No Answer*  6. CAUSE: \"Have you been swimming recently? \", \"How often do you use Q-TIPS? \", \"Have you had any recent air travel or scuba diving? \"      *No Answer*  7. OTHER SYMPTOMS: \"Do you have any other symptoms? \" (e.g., headache, stiff neck, dizziness, vomiting, runny nose, decreased hearing)      Lymph nodes swollen,   8. PREGNANCY: \"Is there any chance you are pregnant? \" \"When was your last menstrual period? \"      *No Answer*    Protocols used: [facility]-ADULT-[place]    Message sent through [employer] for scheduling

## 2020-09-01 ENCOUNTER — OFFICE VISIT (OUTPATIENT)
Dept: FAMILY MEDICINE CLINIC | Age: 45
End: 2020-09-01
Payer: MEDICARE

## 2020-09-01 VITALS
DIASTOLIC BLOOD PRESSURE: 76 MMHG | HEART RATE: 91 BPM | SYSTOLIC BLOOD PRESSURE: 116 MMHG | TEMPERATURE: 98.9 F | OXYGEN SATURATION: 96 %

## 2020-09-01 PROCEDURE — 4004F PT TOBACCO SCREEN RCVD TLK: CPT | Performed by: INTERNAL MEDICINE

## 2020-09-01 PROCEDURE — G8427 DOCREV CUR MEDS BY ELIG CLIN: HCPCS | Performed by: INTERNAL MEDICINE

## 2020-09-01 PROCEDURE — 99213 OFFICE O/P EST LOW 20 MIN: CPT | Performed by: INTERNAL MEDICINE

## 2020-09-01 PROCEDURE — G8419 CALC BMI OUT NRM PARAM NOF/U: HCPCS | Performed by: INTERNAL MEDICINE

## 2020-09-01 RX ORDER — AMOXICILLIN 875 MG/1
875 TABLET, COATED ORAL 2 TIMES DAILY
Qty: 14 TABLET | Refills: 0 | Status: SHIPPED | OUTPATIENT
Start: 2020-09-01 | End: 2020-09-08

## 2020-09-01 RX ORDER — FLUTICASONE PROPIONATE 50 MCG
1 SPRAY, SUSPENSION (ML) NASAL DAILY
Qty: 1 BOTTLE | Refills: 0 | Status: SHIPPED | OUTPATIENT
Start: 2020-09-01 | End: 2020-09-14

## 2020-09-01 ASSESSMENT — ENCOUNTER SYMPTOMS
RHINORRHEA: 0
COUGH: 0
ABDOMINAL PAIN: 0
SORE THROAT: 0
DIARRHEA: 0
VOMITING: 0

## 2020-09-01 NOTE — PROGRESS NOTES
Subjective:       Patient ID:     Valentina Rivas is a 39 y.o. female who presents for   Chief Complaint   Patient presents with    Otalgia    Adenopathy       HPI:  Nursing note reviewed and discussed with patient. Swollen lymph nodes in neck, bilateral, started couple months ago, waxes and wanes. No runny nose, cough, congestion, sore throat, difficulty swallowing, fevers, chills, nausea, vomiting, diarrhea. Tried     Otalgia    There is pain in both (R>L) ears. This is a new problem. The current episode started in the past 7 days. The problem occurs constantly. The problem has been waxing and waning. There has been no fever. The pain is moderate. Associated symptoms include hearing loss. Pertinent negatives include no abdominal pain, coughing, diarrhea, ear discharge, headaches, neck pain, rash, rhinorrhea, sore throat or vomiting. Patient's medications, allergies, past medical, surgical, social and family histories were reviewed and updated as appropriate. Past Medical History:   Diagnosis Date    Depression 2014    Elevated liver enzymes 2017    Hepatitis C      Past Surgical History:   Procedure Laterality Date     SECTION   &        Social History     Tobacco Use    Smoking status: Current Every Day Smoker     Packs/day: 1.00     Years: 25.00     Pack years: 25.00     Types: Cigarettes    Smokeless tobacco: Never Used   Substance Use Topics    Alcohol use: Yes     Alcohol/week: 0.0 standard drinks     Comment: social      Patient Active Problem List   Diagnosis    Depression    Chronic hepatitis C without hepatic coma (HCC)    Rheumatoid nodule of multiple sites ([facility] [address]    Elevated rheumatoid factor    Hepatitis C    Elevated liver enzymes         Prior to Visit Medications    Not on File     Review of Systems  Review of Systems   HENT: Positive for ear pain and hearing loss. Negative for ear discharge, rhinorrhea and sore throat.     Respiratory: Negative for cough. Gastrointestinal: Negative for abdominal pain, diarrhea and vomiting. Musculoskeletal: Negative for neck pain. Skin: Negative for rash. Neurological: Negative for headaches. All other systems reviewed and are negative. Objective:       Physical Exam:  /76 (Site: Left Upper Arm, Position: Sitting, Cuff Size: Medium Adult)   Pulse 91   Temp 98.9 °F (37.2 °C) (Oral)   SpO2 96%    Wt Readings from Last 3 Encounters:   08/03/20 156 lb (70.8 kg)   07/27/20 153 lb 12.8 oz (69.8 kg)   04/03/17 150 lb (68 kg)       Physical Exam  Vitals signs and nursing note reviewed. HENT:      Head: Normocephalic. Right Ear: Ear canal and external ear normal. A middle ear effusion is present. Left Ear: Ear canal and external ear normal. A middle ear effusion is present. Tympanic membrane is injected, erythematous and bulging. Nose: Mucosal edema and rhinorrhea present. Rhinorrhea is purulent. Right Sinus: Frontal sinus tenderness present. No maxillary sinus tenderness. Left Sinus: Maxillary sinus tenderness and frontal sinus tenderness present. Mouth/Throat:      Comments: Purulent post-nasal drip    Neck:      Musculoskeletal: Full passive range of motion without pain. Comments: subcentimeter lymphadenopathy   Lymphadenopathy:      Cervical: Cervical adenopathy (LR) present. Right cervical: Superficial cervical adenopathy present. No deep or posterior cervical adenopathy. Left cervical: Superficial cervical adenopathy and posterior cervical adenopathy present. No deep cervical adenopathy. Neurological:      Mental Status: She is alert.          [facility] Outpatient Visit on 08/04/2020   Component Date Value    Amphetamine Screen, Ur 08/04/2020 NEGATIVE     Barbiturate Screen, Ur 08/04/2020 NEGATIVE     Benzodiazepine Screen, U* 08/04/2020 NEGATIVE     Cocaine Metabolite, Urine 08/04/2020 NEGATIVE     Methadone Screen, Urine 08/04/2020 NEGATIVE     Opiates, Urine 08/04/2020 NEGATIVE     Phencyclidine, Urine 08/04/2020 NEGATIVE     Propoxyphene, Urine 08/04/2020 NOT REPORTED     Cannabinoid Scrn, Ur 08/04/2020 NEGATIVE     Oxycodone Screen, Ur 08/04/2020 NEGATIVE     Methamphetamine, Urine 08/04/2020 NOT REPORTED     Tricyclic Antidepressant* 72/18/3866 NOT REPORTED     MDMA, Urine 08/04/2020 NOT REPORTED     Buprenorphine Urine 08/04/2020 NOT REPORTED     Test Information 08/04/2020 Assay provides medical screening only. The absence of expected drug(s) and/or metabolite(s) may indicate diluted or adulterated urine, limitations of testing or timing of collection. Hospital Outpatient Visit on 08/03/2020   Component Date Value    FibroMeter Patient Score 08/03/2020 0.31     CirrhoMeter Patient Score 08/03/2020 0.01     Fibrosis Metavir Classif* 08/03/2020 SEE NOTE     InflaMeter Patient Score 08/03/2020 0.42     InflaMeter Metavir Class* 08/03/2020 A1/A2     FibroMeter Interpretation 08/03/2020 See Report     Alanine Aminotransferase* 08/03/2020 44*    Aspartate Aminotransfera* 08/03/2020 38     Gamma Glutamyl Transfera* 08/03/2020 13     Urea Nitrogen, Fibrometer 08/03/2020 15     Alpha-2-Macroglobulin, F* 08/03/2020 300*    FibroMeter Platelet Count 48/81/5362 194     FibroMeter Prothrombin I* 08/03/2020 102     EER FibroMeter Report 08/03/2020 See Note     Specimen Description 08/03/2020 . PLASMA     Special Requests 08/03/2020 NOT REPORTED     Direct Exam 08/03/2020 HCV RNA DETECTED [id number] IU/ML (6.65 LOG IU/ML) This test is a sensitive method for quantitating HCV RNA viral loads in plasma. It utilizes RT-PCR in the FDA approved Roche Ampliprep/Taqman 48 System. This test is intended for detecting and quantifying HCV RNA viral loads in the range of 15 IU/mL to 100,000,000 IU/mL (1.18 log IU/mL to 8.00 log IU/mL). Patients should have confirmed HCV infection prior to RNA quantification.   This test has been developed to monitor disease progression and efficacy of anti-HCV drug therapy. This test has been optimized for HCV genotypes 1-6. *    Direct Exam 08/03/2020 Results reported to the appropriate Health Department     Hepatitis C Genotype 08/03/2020 1a or 1b     HCV Quantitative 08/03/2020 NOT REPORTED     Hemochromatosis Specimen 08/03/2020 Whole Blood     H63D Hemochrom Mut 08/03/2020 Homozygous     C282Y Hemochrom Mut 08/03/2020 Negative     S65C Hemochrom Mut 08/03/2020 Negative     Hemochromatosis Mutation* 08/03/2020 See Note     Ethanol 08/03/2020 <10     Ethanol percent 08/03/2020 <0.010    Hospital Outpatient Visit on 07/27/2020   Component Date Value    WBC 07/27/2020 8.3     RBC 07/27/2020 5.15*    Hemoglobin 07/27/2020 16.0*    Hematocrit 07/27/2020 50.1*    MCV 07/27/2020 97.3     MCH 07/27/2020 31.1     MCHC 07/27/2020 31.9     RDW 07/27/2020 13.0     Platelets 49/57/9147 196     MPV 07/27/2020 12.0     NRBC Automated 07/27/2020 0.0     Differential Type 07/27/2020 NOT REPORTED     Seg Neutrophils 07/27/2020 68*    Lymphocytes 07/27/2020 22*    Monocytes 07/27/2020 9     Eosinophils % 07/27/2020 1     Basophils 07/27/2020 0     Immature Granulocytes 07/27/2020 0     Segs Absolute 07/27/2020 5.66     Absolute Lymph # 07/27/2020 1.81     Absolute Mono # 07/27/2020 0.73     Absolute Eos # 07/27/2020 0.07     Basophils Absolute 07/27/2020 0.03     Absolute Immature Granul* 07/27/2020 0.02     WBC Morphology 07/27/2020 NOT REPORTED     RBC Morphology 07/27/2020 NOT REPORTED     Platelet Estimate 66/36/6578 NOT REPORTED     Ferritin 07/27/2020 161*   Hospital Outpatient Visit on 07/06/2020   Component Date Value    Thyroid Peroxidase (Tpo)* 07/06/2020 412.0*    Thyroglobulin Ab 07/06/2020 <20.0     Thyroxine, Free 07/06/2020 1.02     Vitamin B-12 07/06/2020 451     Vit D, 25-Hydroxy 07/06/2020 21.9*    Iron 07/06/2020 217*    TIBC 07/06/2020 270     Iron Saturation 07/06/2020 80*    UIBC 07/06/2020 [facility] [place] Outpatient Visit on 06/23/2020   Component Date Value    HIV Ag/Ab 06/23/2020 NONREACTIVE     Cholesterol 06/23/2020 166     HDL 06/23/2020 52     LDL Cholesterol 06/23/2020 90     Chol/HDL Ratio 06/23/2020 3.2     Triglycerides 06/23/2020 120     VLDL 06/23/2020 NOT REPORTED     TSH 06/23/2020 3.88     Glucose 06/23/2020 96     BUN 06/23/2020 15     CREATININE 06/23/2020 0.69     Bun/Cre Ratio 06/23/2020 NOT REPORTED     Calcium 06/23/2020 8.9     Sodium 06/23/2020 140     Potassium 06/23/2020 4.2     Chloride 06/23/2020 104     CO2 06/23/2020 26     Anion Gap 06/23/2020 10     Alkaline Phosphatase 06/23/2020 51     ALT 06/23/2020 37*    AST 06/23/2020 34*    Total Bilirubin 06/23/2020 0.70     Total Protein 06/23/2020 7.2     Alb 06/23/2020 4.0     Albumin/Globulin Ratio 06/23/2020 1.3     GFR Non- 06/23/2020 >60     GFR  06/23/2020 >60     GFR Comment 06/23/2020          GFR Staging 06/23/2020 NOT REPORTED     WBC 06/23/2020 8.9     RBC 06/23/2020 4.94     Hemoglobin 06/23/2020 15.5*    Hematocrit 06/23/2020 47.8*    MCV 06/23/2020 96.8     MCH 06/23/2020 31.4     MCHC 06/23/2020 32.4     RDW 06/23/2020 13.2     Platelets 86/21/9131 195     MPV 06/23/2020 11.9     NRBC Automated 06/23/2020 0.0           Assessment/Plan:      1. Acute bacterial sinusitis  - amoxicillin (AMOXIL) 875 MG tablet; Take 1 tablet by mouth 2 times daily for 7 days  Dispense: 14 tablet; Refill: 0  - fluticasone (FLONASE) 50 MCG/ACT nasal spray; 1 spray by Nasal route daily  Dispense: 1 Bottle; Refill: 0    2. Cervical lymphadenopathy  Reassess in one month, if persistent will get imaging and labs     3. Left acute otitis media  - amoxicillin (AMOXIL) 875 MG tablet; Take 1 tablet by mouth 2 times daily for 7 days  Dispense: 14 tablet;  Refill: 0           Health Maintenance Due   Topic Date Due    Hepatitis A vaccine (1 of 2 - Risk 2-dose series) 07/21/1978    Pneumococcal 0-64 years Vaccine (1 of 1 - PPSV23) 07/21/1983    Hepatitis B vaccine (2 of 3 - Risk 3-dose series) 01/29/2004    Cervical cancer screen  01/16/2018       Electronically signed by Ora Gaitan MD on 9/1/2020 at 5:36 PM

## 2020-09-01 NOTE — PROGRESS NOTES
Pt is here today for Lymph nodes and bilateral ear pain. She states started in end of July with lymph nodes and is now having ear pain.     Pt has declined flu vaccine and will wait 6 months to do Tdap

## 2020-09-08 ENCOUNTER — HOSPITAL ENCOUNTER (OUTPATIENT)
Facility: MEDICAL CENTER | Age: 45
End: 2020-09-08
Payer: MEDICARE

## 2020-09-14 ENCOUNTER — OFFICE VISIT (OUTPATIENT)
Dept: ONCOLOGY | Age: 45
End: 2020-09-14
Payer: MEDICARE

## 2020-09-14 ENCOUNTER — TELEPHONE (OUTPATIENT)
Dept: ONCOLOGY | Age: 45
End: 2020-09-14

## 2020-09-14 ENCOUNTER — TELEPHONE (OUTPATIENT)
Dept: GASTROENTEROLOGY | Age: 45
End: 2020-09-14

## 2020-09-14 VITALS
WEIGHT: 156.9 LBS | BODY MASS INDEX: 26.93 KG/M2 | SYSTOLIC BLOOD PRESSURE: 108 MMHG | RESPIRATION RATE: 16 BRPM | TEMPERATURE: 97.1 F | HEART RATE: 57 BPM | DIASTOLIC BLOOD PRESSURE: 60 MMHG

## 2020-09-14 PROCEDURE — 4004F PT TOBACCO SCREEN RCVD TLK: CPT | Performed by: INTERNAL MEDICINE

## 2020-09-14 PROCEDURE — G8419 CALC BMI OUT NRM PARAM NOF/U: HCPCS | Performed by: INTERNAL MEDICINE

## 2020-09-14 PROCEDURE — G8427 DOCREV CUR MEDS BY ELIG CLIN: HCPCS | Performed by: INTERNAL MEDICINE

## 2020-09-14 PROCEDURE — 99211 OFF/OP EST MAY X REQ PHY/QHP: CPT | Performed by: INTERNAL MEDICINE

## 2020-09-14 PROCEDURE — 99214 OFFICE O/P EST MOD 30 MIN: CPT | Performed by: INTERNAL MEDICINE

## 2020-09-14 NOTE — TELEPHONE ENCOUNTER
[facility] ARRIVES AMBULATORY FOR MD VISIT  DR Jeffery Mealing IN TO SEE PATIENT  ORDERS RECEIVED  RV IN [place] W/CDP  LABS CDP 01/11/21  MD VISIT 01/11/21 @11AM  AVS PRINTED AND MAILED TO PATIENT (PT LEFT WITHOUT AVS)  PATIENT DISCHARGED AMBULATORY

## 2020-09-14 NOTE — PROGRESS NOTES
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 female who is referred to us for polycythemia. She denies any previous history of polycythemia or hemachromatosis. She does smoke a pack daily and has for the last 30 years. She has goiter and reports medication makes her sleepy and she hasn't been taking. She complains of throat swelling and soreness. She has been diagnosed with hepatitis C, unable to receive treatment due to insurance. She denies history of illegal drug use. Interim history  She comes in today for a follow-up, she feels well and has no complaints. She is considering treatment for hepatitis C as discussed    PAST MEDICAL HISTORY: has a past medical history of Depression, Elevated liver enzymes, and Hepatitis C. PAST SURGICAL HISTORY: has a past surgical history that includes  section ( & ). CURRENT MEDICATIONS:  has a current medication list which includes the following prescription(s): nicotine. ALLERGIES:  has No Known Allergies. FAMILY HISTORY: Negative for any hematological or oncological conditions. SOCIAL HISTORY:  reports that she has been smoking cigarettes. She has a 25.00 pack-year smoking history. She has never used smokeless tobacco. She reports current alcohol use. She reports that she does not use drugs. REVIEW OF SYSTEMS:   General: No fever or night sweats. Weight is stable. ENT: No double or blurred vision, no tinnitus or hearing problem, no dysphagia; + sore throat with swelling   Respiratory: No chest pain, no shortness of breath, no cough or hemoptysis. Cardiovascular: Denies chest pain, PND or orthopnea. No L E swelling or palpitations. Gastrointestinal: No nausea or vomiting, abdominal pain, diarrhea or constipation.    Genitourinary: Denies dysuria, hematuria, frequency, urgency or incontinence. Neurological: Denies headaches, decreased LOC, no sensory or motor focal deficits. Musculoskeletal: No arthralgia no back pain or joint swelling. Skin: There are no rashes or bleeding. Psychiatric: No anxiety, no depression. Endocrine: No diabetes or thyroid disease. Hematologic: No bleeding, no adenopathy. PHYSICAL EXAM: Shows a well appearing 39y.o.-year-old female who is not in pain or distress. Vital Signs: Blood pressure 108/60, pulse 57, temperature 97.1 °F (36.2 °C), temperature source Temporal, resp. rate 16, weight 156 lb 14.4 oz (71.2 kg), not currently breastfeeding. HEENT: Normocephalic and atraumatic. Pupils are equal, round, reactive to light and accommodation. Extraocular muscles are intact. Neck: Goiter Showed no JVD, no carotid bruit . Lungs: Clear to auscultation bilaterally. Heart: Regular without any murmur. Abdomen: Soft, nontender. No hepatosplenomegaly. Extremities: Lower extremities show no edema, clubbing, or cyanosis. Breasts: Examination not done today.  Neuro exam: intact cranial nerves bilaterally no motor or sensory deficit, gait is normal. Lymphatic: no adenopathy appreciated in the supraclavicular, axillary, cervical or inguinal area    REVIEW OF LABORATORY DATA:   Lab Results   Component Value Date    WBC 8.3 07/27/2020    HGB 16.0 (H) 07/27/2020    HCT 50.1 (H) 07/27/2020    MCV 97.3 07/27/2020     07/27/2020       Chemistry        Component Value Date/Time     06/23/2020 1135    K 4.2 06/23/2020 1135     06/23/2020 1135    CO2 26 06/23/2020 1135    BUN 15 06/23/2020 1135    CREATININE 0.69 06/23/2020 1135        Component Value Date/Time    CALCIUM 8.9 06/23/2020 1135    ALKPHOS 51 06/23/2020 1135    AST 34 (H) 06/23/2020 1135    ALT 37 (H) 06/23/2020 1135    BILITOT 0.70 06/23/2020 1135        Lab Results   Component Value Date    IRON 217 (H) 07/06/2020    TIBC 270 07/06/2020    FERRITIN 161 (H) 07/27/2020 REVIEW OF RADIOLOGICAL RESULTS:     IMPRESSION:   1. Polycythemia  2. Goiter  3. Smoking addiction  4. HX hep C  5. 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 We reviewed her current and historical lab work, currently polycythemia with elevated iron saturation. Work-up suggest primary polycythemia which H 60 3D mutation but her ferritin is not too high. Her ferritin is not too high. I think we can manage this with dietary changes and careful observation. We will get her treated for hepatitis C and think that will help.   We will see her back in January for follow-up

## 2020-09-14 NOTE — TELEPHONE ENCOUNTER
Patient needs new blood work and Elastography   Patient called and notified. Left message that Hep antibody tests are needed A B and C and an Elastography that [place] insurance requires. She will need a new Drug and Alcohol screen due to needing in within 30 days.

## 2020-12-04 ENCOUNTER — OFFICE VISIT (OUTPATIENT)
Dept: PRIMARY CARE CLINIC | Age: 45
End: 2020-12-04
Payer: MEDICARE

## 2020-12-04 ENCOUNTER — HOSPITAL ENCOUNTER (OUTPATIENT)
Age: 45
Setting detail: SPECIMEN
Discharge: HOME | End: 2020-12-04
Payer: MEDICARE

## 2020-12-04 VITALS
DIASTOLIC BLOOD PRESSURE: 86 MMHG | OXYGEN SATURATION: 97 % | HEART RATE: 97 BPM | SYSTOLIC BLOOD PRESSURE: 132 MMHG | TEMPERATURE: 101.9 F | BODY MASS INDEX: 28.32 KG/M2 | WEIGHT: 165 LBS

## 2020-12-04 LAB — S PYO AG THROAT QL: NORMAL

## 2020-12-04 PROCEDURE — 99214 OFFICE O/P EST MOD 30 MIN: CPT | Performed by: NURSE PRACTITIONER

## 2020-12-04 PROCEDURE — G8427 DOCREV CUR MEDS BY ELIG CLIN: HCPCS | Performed by: NURSE PRACTITIONER

## 2020-12-04 PROCEDURE — G8419 CALC BMI OUT NRM PARAM NOF/U: HCPCS | Performed by: NURSE PRACTITIONER

## 2020-12-04 PROCEDURE — 4004F PT TOBACCO SCREEN RCVD TLK: CPT | Performed by: NURSE PRACTITIONER

## 2020-12-04 PROCEDURE — G8484 FLU IMMUNIZE NO ADMIN: HCPCS | Performed by: NURSE PRACTITIONER

## 2020-12-04 PROCEDURE — 87880 STREP A ASSAY W/OPTIC: CPT | Performed by: NURSE PRACTITIONER

## 2020-12-04 ASSESSMENT — ENCOUNTER SYMPTOMS
SHORTNESS OF BREATH: 0
SORE THROAT: 1
EYES NEGATIVE: 1
ALLERGIC/IMMUNOLOGIC NEGATIVE: 1
DIARRHEA: 0
VOMITING: 0
NAUSEA: 0
EYE ITCHING: 0
EYE DISCHARGE: 0
ABDOMINAL PAIN: 0
COUGH: 0
CHEST TIGHTNESS: 0

## 2020-12-04 NOTE — PATIENT INSTRUCTIONS

## 2020-12-09 LAB — SARS-COV-2, NAA: DETECTED

## 2020-12-10 ENCOUNTER — TELEPHONE (OUTPATIENT)
Dept: ADMINISTRATIVE | Age: 45
End: 2020-12-10

## 2020-12-10 ENCOUNTER — TELEPHONE (OUTPATIENT)
Dept: PRIMARY CARE CLINIC | Age: 45
End: 2020-12-10

## 2021-10-04 ENCOUNTER — HOSPITAL ENCOUNTER (OUTPATIENT)
Age: 46
Setting detail: SPECIMEN
Discharge: HOME | End: 2021-10-04
Payer: MEDICARE

## 2021-10-04 ENCOUNTER — OFFICE VISIT (OUTPATIENT)
Dept: FAMILY MEDICINE CLINIC | Age: 46
End: 2021-10-04
Payer: MEDICARE

## 2021-10-04 VITALS
HEIGHT: 64 IN | DIASTOLIC BLOOD PRESSURE: 70 MMHG | WEIGHT: 152 LBS | SYSTOLIC BLOOD PRESSURE: 104 MMHG | OXYGEN SATURATION: 97 % | HEART RATE: 68 BPM | BODY MASS INDEX: 25.95 KG/M2

## 2021-10-04 DIAGNOSIS — H61.21 IMPACTED CERUMEN OF RIGHT EAR: ICD-10-CM

## 2021-10-04 DIAGNOSIS — E06.9 THYROIDITIS: ICD-10-CM

## 2021-10-04 DIAGNOSIS — N95.1 MENOPAUSAL SYMPTOMS: ICD-10-CM

## 2021-10-04 DIAGNOSIS — F17.210 CIGARETTE NICOTINE DEPENDENCE WITHOUT COMPLICATION: ICD-10-CM

## 2021-10-04 DIAGNOSIS — Z13.1 SCREENING FOR DIABETES MELLITUS: ICD-10-CM

## 2021-10-04 DIAGNOSIS — Z23 NEED FOR TDAP VACCINATION: ICD-10-CM

## 2021-10-04 DIAGNOSIS — Z13.29 SCREENING FOR THYROID DISORDER: ICD-10-CM

## 2021-10-04 DIAGNOSIS — Z00.00 ANNUAL VISIT FOR GENERAL ADULT MEDICAL EXAMINATION WITHOUT ABNORMAL FINDINGS: Primary | ICD-10-CM

## 2021-10-04 DIAGNOSIS — Z13.220 SCREENING FOR LIPID DISORDERS: ICD-10-CM

## 2021-10-04 DIAGNOSIS — E56.9 VITAMIN DEFICIENCY: ICD-10-CM

## 2021-10-04 DIAGNOSIS — M06.39 RHEUMATOID NODULE OF MULTIPLE SITES (HCC): ICD-10-CM

## 2021-10-04 DIAGNOSIS — B18.2 CHRONIC HEPATITIS C WITHOUT HEPATIC COMA (HCC): ICD-10-CM

## 2021-10-04 DIAGNOSIS — Z13.0 SCREENING FOR DEFICIENCY ANEMIA: ICD-10-CM

## 2021-10-04 DIAGNOSIS — L81.1 MELASMA: ICD-10-CM

## 2021-10-04 LAB
ALBUMIN SERPL-MCNC: 4.3 G/DL (ref 3.5–5.2)
ALBUMIN/GLOBULIN RATIO: 1.2 (ref 1–2.5)
ALP BLD-CCNC: 64 U/L (ref 35–104)
ALT SERPL-CCNC: 42 U/L (ref 5–33)
ANION GAP SERPL CALCULATED.3IONS-SCNC: 13 MMOL/L (ref 9–17)
AST SERPL-CCNC: 37 U/L
BILIRUB SERPL-MCNC: 0.35 MG/DL (ref 0.3–1.2)
BUN BLDV-MCNC: 11 MG/DL (ref 6–20)
BUN/CREAT BLD: ABNORMAL (ref 9–20)
CALCIUM SERPL-MCNC: 9.2 MG/DL (ref 8.6–10.4)
CHLORIDE BLD-SCNC: 106 MMOL/L (ref 98–107)
CHOLESTEROL, FASTING: 190 MG/DL
CHOLESTEROL/HDL RATIO: 3.4
CO2: 23 MMOL/L (ref 20–31)
CREAT SERPL-MCNC: 0.65 MG/DL (ref 0.5–0.9)
GFR AFRICAN AMERICAN: >60 ML/MIN
GFR NON-AFRICAN AMERICAN: >60 ML/MIN
GFR SERPL CREATININE-BSD FRML MDRD: ABNORMAL ML/MIN/{1.73_M2}
GFR SERPL CREATININE-BSD FRML MDRD: ABNORMAL ML/MIN/{1.73_M2}
GLUCOSE BLD-MCNC: 78 MG/DL (ref 70–99)
HCT VFR BLD CALC: 50.6 % (ref 36.3–47.1)
HDLC SERPL-MCNC: 56 MG/DL
HEMOGLOBIN: 16 G/DL (ref 11.9–15.1)
LDL CHOLESTEROL: 114 MG/DL (ref 0–130)
MCH RBC QN AUTO: 31 PG (ref 25.2–33.5)
MCHC RBC AUTO-ENTMCNC: 31.6 G/DL (ref 28.4–34.8)
MCV RBC AUTO: 98.1 FL (ref 82.6–102.9)
NRBC AUTOMATED: 0 PER 100 WBC
PDW BLD-RTO: 13.2 % (ref 11.8–14.4)
PLATELET # BLD: 188 K/UL (ref 138–453)
PMV BLD AUTO: 12.5 FL (ref 8.1–13.5)
POTASSIUM SERPL-SCNC: 4.5 MMOL/L (ref 3.7–5.3)
RBC # BLD: 5.16 M/UL (ref 3.95–5.11)
SODIUM BLD-SCNC: 142 MMOL/L (ref 135–144)
THYROXINE, FREE: 1.12 NG/DL (ref 0.93–1.7)
TOTAL PROTEIN: 7.8 G/DL (ref 6.4–8.3)
TRIGLYCERIDE, FASTING: 100 MG/DL
TSH SERPL DL<=0.05 MIU/L-ACNC: 5.94 MIU/L (ref 0.3–5)
VITAMIN D 25-HYDROXY: 38.1 NG/ML (ref 30–100)
VLDLC SERPL CALC-MCNC: NORMAL MG/DL (ref 1–30)
WBC # BLD: 8.8 K/UL (ref 3.5–11.3)

## 2021-10-04 PROCEDURE — 90715 TDAP VACCINE 7 YRS/> IM: CPT | Performed by: NURSE PRACTITIONER

## 2021-10-04 PROCEDURE — 90471 IMMUNIZATION ADMIN: CPT | Performed by: NURSE PRACTITIONER

## 2021-10-04 PROCEDURE — G8484 FLU IMMUNIZE NO ADMIN: HCPCS | Performed by: NURSE PRACTITIONER

## 2021-10-04 PROCEDURE — 99396 PREV VISIT EST AGE 40-64: CPT | Performed by: NURSE PRACTITIONER

## 2021-10-04 SDOH — ECONOMIC STABILITY: FOOD INSECURITY: WITHIN THE PAST 12 MONTHS, THE FOOD YOU BOUGHT JUST DIDN'T LAST AND YOU DIDN'T HAVE MONEY TO GET MORE.: NEVER TRUE

## 2021-10-04 SDOH — ECONOMIC STABILITY: FOOD INSECURITY: WITHIN THE PAST 12 MONTHS, YOU WORRIED THAT YOUR FOOD WOULD RUN OUT BEFORE YOU GOT MONEY TO BUY MORE.: NEVER TRUE

## 2021-10-04 ASSESSMENT — ENCOUNTER SYMPTOMS
ABDOMINAL PAIN: 0
BACK PAIN: 0
EYE PAIN: 0
COUGH: 0
PHOTOPHOBIA: 0
NAUSEA: 0
SORE THROAT: 0
CONSTIPATION: 0
SINUS PAIN: 0
VOMITING: 0
SHORTNESS OF BREATH: 0
SINUS PRESSURE: 0
DIARRHEA: 0
COLOR CHANGE: 1
CHEST TIGHTNESS: 0

## 2021-10-04 ASSESSMENT — PATIENT HEALTH QUESTIONNAIRE - PHQ9
SUM OF ALL RESPONSES TO PHQ QUESTIONS 1-9: 0
2. FEELING DOWN, DEPRESSED OR HOPELESS: 0
SUM OF ALL RESPONSES TO PHQ QUESTIONS 1-9: 0
SUM OF ALL RESPONSES TO PHQ9 QUESTIONS 1 & 2: 0
SUM OF ALL RESPONSES TO PHQ QUESTIONS 1-9: 0
1. LITTLE INTEREST OR PLEASURE IN DOING THINGS: 0

## 2021-10-04 ASSESSMENT — SOCIAL DETERMINANTS OF HEALTH (SDOH): HOW HARD IS IT FOR YOU TO PAY FOR THE VERY BASICS LIKE FOOD, HOUSING, MEDICAL CARE, AND HEATING?: NOT HARD AT ALL

## 2021-10-04 NOTE — PROGRESS NOTES
[address] YOUSSEF  [phone]    Tiffanie Tenorio is a 42 y.o. female who presents today for her  medical conditions/complaints as noted below. Tiffanie Tenorio is c/o of Annual Exam  .    HPI:     Presents for annual physical  Has lost 13lbs since last visit - surprised by this, has only incorporated fiber into diet   Taking daily MVI and vitamin D    GI: Needs to reach back out to Dr. Solomon Alarcon   Has not completed follow up labs  She has his contact information    Polycythemia: Needs to reach back out to Dean, was supposed to follow up in January of this year  She has his contact information and will call later    Thyroid: Stopped taking synthroid - it made her foggy, did not even feel comfortable even driving   Continues to have hair falling out - especially in her eyebrows  Although she feels better than she did last year, she still does not feel right                          GYN: Reports periods becoming more irregular had one in January and then this month   Concerned with melasma around mouth - managing with retinol-a creams  Would like to establish with GYN for further discussion - may be loosing insurance at the end of this year    Smoking: Continues to smoke 0.5 pack per day    Willing to update dtap  Willing to update annual labs   Denies any other problems/concerns at this time       Current Outpatient Medications   Medication Sig Dispense Refill    carbamide peroxide (DEBROX) 6.5 % otic solution Place 5 drops in ear(s) 2 times daily 15 mL 0     No current facility-administered medications for this visit.      Past Medical History:   Diagnosis Date    Depression 2014    Elevated liver enzymes 2017    Hepatitis C       Past Surgical History:   Procedure Laterality Date     SECTION   &      Family History   Problem Relation Age of Onset    COPD Mother     Emphysema Mother     Rheum Arthritis Mother     Other Maternal Grandmother         breast cancer    Colon Cancer Maternal Uncle      Social History     Tobacco Use    Smoking status: Current Every Day Smoker     Packs/day: 1.00     Years: 25.00     Pack years: 25.00     Types: Cigarettes    Smokeless tobacco: Never Used   Substance Use Topics    Alcohol use: Yes     Alcohol/week: 0.0 standard drinks     Comment: social      No Known Allergies    Subjective:      Review of Systems   Constitutional: Positive for activity change. Negative for chills, fatigue and unexpected weight change. HENT: Negative for congestion, ear discharge, ear pain, hearing loss, postnasal drip, sinus pressure, sinus pain, sore throat and tinnitus. Eyes: Negative for photophobia, pain and visual disturbance. Respiratory: Negative for cough, chest tightness and shortness of breath. Cardiovascular: Negative for chest pain and palpitations. Gastrointestinal: Negative for abdominal pain, constipation, diarrhea, nausea and vomiting. Endocrine: Negative for polydipsia, polyphagia and polyuria. Genitourinary: Negative for dysuria, flank pain, frequency and urgency. Musculoskeletal: Negative for arthralgias, back pain and myalgias. Skin: Positive for color change. Negative for rash. Neurological: Negative for dizziness, weakness, light-headedness, numbness and headaches. Psychiatric/Behavioral: Negative for confusion, hallucinations, self-injury, sleep disturbance and suicidal ideas. The patient is not nervous/anxious.       Other pertinent ROS in HPI  Objective:     /70 (Site: Left Upper Arm, Position: Sitting, Cuff Size: Large Adult)   Pulse 68   Ht 5' 4\" (1.626 m)   Wt 152 lb (68.9 kg)   SpO2 97%   BMI 26.09 kg/m²      Wt Readings from Last 3 Encounters:   10/04/21 152 lb (68.9 kg)   12/04/20 165 lb (74.8 kg)   09/14/20 156 lb 14.4 oz (71.2 kg)     Temp Readings from Last 3 Encounters:   12/04/20 101.9 °F (38.8 °C) (Oral)   09/14/20 97.1 °F (36.2 °C) (Temporal) 09/01/20 98.9 °F (37.2 °C) (Oral)     BP Readings from Last 3 Encounters:   10/04/21 104/70   12/04/20 132/86   09/14/20 108/60     Pulse Readings from Last 3 Encounters:   10/04/21 68   12/04/20 97   09/14/20 57       Physical Exam  Vitals reviewed. Constitutional:       Appearance: Normal appearance. HENT:      Head: Normocephalic. Right Ear: Tympanic membrane normal.      Left Ear: Tympanic membrane normal.      Nose: Nose normal.      Mouth/Throat:      Mouth: Mucous membranes are moist.      Pharynx: Oropharynx is clear. Eyes:      Extraocular Movements: Extraocular movements intact. Conjunctiva/sclera: Conjunctivae normal.      Pupils: Pupils are equal, round, and reactive to light. Cardiovascular:      Rate and Rhythm: Normal rate and regular rhythm. Pulses: Normal pulses. Heart sounds: Normal heart sounds. Pulmonary:      Effort: Pulmonary effort is normal.      Breath sounds: Normal breath sounds. Abdominal:      General: Abdomen is flat. Bowel sounds are normal.      Palpations: Abdomen is soft. Genitourinary:     Rectum: Normal.   Musculoskeletal:         General: Normal range of motion. Cervical back: Normal range of motion. Skin:     General: Skin is warm and dry. Capillary Refill: Capillary refill takes less than 2 seconds. Neurological:      General: No focal deficit present. Mental Status: She is alert and oriented to person, place, and time. Mental status is at baseline. Psychiatric:         Mood and Affect: Mood normal.         Behavior: Behavior normal.         Thought Content: Thought content normal.         Judgment: Judgment normal.         Lab Review   No visits with results within 6 Month(s) from this visit. Latest known visit with results is:   Hospital Outpatient Visit on 12/04/2020   Component Date Value    SARS-CoV-2, NIKKIE 12/04/2020 Detected*     Assessment/PLAN   1.  Annual visit for general adult medical examination without Instructed to continue current medications, diet andexercise. 1.  Darlene received counseling on the following healthy behaviors: nutrition, exercise, medication adherence and tobacco cessation  2. Patient given educationalmaterials when available - see patient instructions when applicable  3. Discussed use, benefit, and side effects of prescribed medications. Barriersto medication compliance addressed. All patient questions answered. Pt voiced understanding. 4.  Reviewed prior labs and health maintenance when applicable. 5.  Continue current medications, diet and exercise. CompletedRefills   Requested Prescriptions     Signed Prescriptions Disp Refills    carbamide peroxide (DEBROX) 6.5 % otic solution 15 mL 0     Sig: Place 5 drops in ear(s) 2 times daily       This note was transcribed using dictation with Dragon services. Efforts were made to correct any errors but some words may be misinterpreted.     Electronically signed by CYNTHIA Meyers NP, CNP on 10/4/2021 at 1:57 PM

## 2021-10-04 NOTE — PROGRESS NOTES
After obtaining consent, and per orders of Yecenia Mancuso, injection of Boostrix  given in Left deltoid by Willi Martins MA. Patient instructed to remain in clinic for 20 minutes afterwards, and to report any adverse reaction to me immediately.

## 2021-10-05 LAB
THYROGLOBULIN AB: 74 IU/ML (ref 0–40)
THYROID PEROXIDASE (TPO) AB: 542 IU/ML (ref 0–25)

## 2021-10-06 DIAGNOSIS — E06.9 THYROIDITIS: ICD-10-CM

## 2021-10-06 DIAGNOSIS — R76.8 ANTI-TPO ANTIBODIES PRESENT: ICD-10-CM

## 2021-10-06 DIAGNOSIS — R76.8 THYROGLOBULIN ANTIBODY POSITIVE: Primary | ICD-10-CM

## 2021-10-08 ENCOUNTER — OFFICE VISIT (OUTPATIENT)
Dept: FAMILY MEDICINE CLINIC | Age: 46
End: 2021-10-08
Payer: MEDICARE

## 2021-10-08 VITALS
WEIGHT: 153.8 LBS | OXYGEN SATURATION: 98 % | HEART RATE: 60 BPM | BODY MASS INDEX: 26.26 KG/M2 | RESPIRATION RATE: 20 BRPM | HEIGHT: 64 IN | DIASTOLIC BLOOD PRESSURE: 64 MMHG | SYSTOLIC BLOOD PRESSURE: 110 MMHG

## 2021-10-08 DIAGNOSIS — H61.21 IMPACTED CERUMEN OF RIGHT EAR: Primary | ICD-10-CM

## 2021-10-08 PROCEDURE — 99212 OFFICE O/P EST SF 10 MIN: CPT | Performed by: NURSE PRACTITIONER

## 2021-10-08 PROCEDURE — 69209 REMOVE IMPACTED EAR WAX UNI: CPT | Performed by: NURSE PRACTITIONER

## 2021-10-08 PROCEDURE — G8427 DOCREV CUR MEDS BY ELIG CLIN: HCPCS | Performed by: NURSE PRACTITIONER

## 2021-10-08 PROCEDURE — G8484 FLU IMMUNIZE NO ADMIN: HCPCS | Performed by: NURSE PRACTITIONER

## 2021-10-08 PROCEDURE — G8419 CALC BMI OUT NRM PARAM NOF/U: HCPCS | Performed by: NURSE PRACTITIONER

## 2021-10-08 PROCEDURE — 4004F PT TOBACCO SCREEN RCVD TLK: CPT | Performed by: NURSE PRACTITIONER

## 2021-10-08 ASSESSMENT — ENCOUNTER SYMPTOMS
NAUSEA: 0
SINUS PAIN: 0
BACK PAIN: 0
COLOR CHANGE: 0
SINUS PRESSURE: 0
CHEST TIGHTNESS: 0
EYE PAIN: 0
DIARRHEA: 0
VOMITING: 0
SHORTNESS OF BREATH: 0
ABDOMINAL PAIN: 0
CONSTIPATION: 0
SORE THROAT: 0

## 2021-10-08 NOTE — PROGRESS NOTES
[address] YOUS[place]  [phone]    Alyssa Barnes is a 42 y.o. female who presents today for her  medical conditions/complaints as noted below. Alyssa Barnes is c/o of Ear Drainage (has been using debrox)  . HPI:     Presents for acute visit    Reports she used debrox x1 in right ear two days ago  Since then noticed sounds being muffled   Denies ear pain, drainage     Dr. Noa Parra contact information provided in AVS      Current Outpatient Medications   Medication Sig Dispense Refill    carbamide peroxide (DEBROX) 6.5 % otic solution Place 5 drops in ear(s) 2 times daily 15 mL 0     No current facility-administered medications for this visit. Past Medical History:   Diagnosis Date    Depression 2014    Elevated liver enzymes 2017    Hepatitis C       Past Surgical History:   Procedure Laterality Date     SECTION   &      Family History   Problem Relation Age of Onset   Elsa Monge COPD Mother     Emphysema Mother     Rheum Arthritis Mother     Other Maternal Grandmother         breast cancer    Colon Cancer Maternal Uncle      Social History     Tobacco Use    Smoking status: Current Every Day Smoker     Packs/day: 1.00     Years: 25.00     Pack years: 25.00     Types: Cigarettes    Smokeless tobacco: Never Used   Substance Use Topics    Alcohol use: Yes     Alcohol/week: 0.0 standard drinks     Comment: social      No Known Allergies    Subjective:      Review of Systems   Constitutional: Negative for activity change, chills, fatigue and unexpected weight change. HENT: Positive for hearing loss ('muffled' hearing right ear). Negative for congestion, ear discharge, ear pain, postnasal drip, sinus pressure, sinus pain, sore throat and tinnitus. Eyes: Negative for pain and visual disturbance. Respiratory: Negative for chest tightness and shortness of breath.     Cardiovascular: Negative for chest pain and Refill: Capillary refill takes less than 2 seconds. Neurological:      General: No focal deficit present. Mental Status: She is alert and oriented to person, place, and time. Mental status is at baseline. Psychiatric:         Mood and Affect: Mood normal.         Behavior: Behavior normal.         Thought Content: Thought content normal.         Judgment: Judgment normal.         Lab Review not applicable  Assessment/PLAN   1. Impacted cerumen of right ear  -     DE REMOVAL IMPACTED CERUMEN IRRIGATION/LVG UNILAT     Ceruminosis is noted. Wax is removed by syringing and manual debridement. Instructions for home care to prevent wax buildup are given. Follow up in office if ear pain, discharge develop. RTO if symptoms worsen or fail to improve  Pt agreeable with plan      Patient given educational materials - see patientinstructions. Discussed use, benefit, and side effects of prescribed medications. All patient questions answered. Pt voiced understanding. Reviewed health maintenance. Instructed to continue current medications, diet andexercise. 1.  Darlene received counseling on the following healthy behaviors: nutrition, exercise and medication adherence  2. Patient given educationalmaterials when available - see patient instructions when applicable  3. Discussed use, benefit, and side effects of prescribed medications. Barriersto medication compliance addressed. All patient questions answered. Pt voiced understanding. 4.  Reviewed prior labs and health maintenance when applicable. 5.  Continue current medications, diet and exercise. CompletedRefills   Requested Prescriptions      No prescriptions requested or ordered in this encounter       This note was transcribed using dictation with Dragon services. Efforts were made to correct any errors but some words may be misinterpreted.     Electronically signed by CYNTHIA Espinoza NP, CNP on 10/8/2021 at 8:10 AM

## 2021-10-08 NOTE — PATIENT INSTRUCTIONS
[facility], Rama Melvin MD   [address]  [address] [place] [address]   [phone]  Patient Education        Earwax Blockage: Care Instructions  Your Care Instructions     Earwax is a natural substance that protects the ear canal. Normally, earwax drains from the ears and does not cause problems. Sometimes earwax builds up and hardens. Earwax blockage (also called cerumen impaction) can cause some loss of hearing and pain. When wax is tightly packed, you will need to have your doctor remove it. Follow-up care is a key part of your treatment and safety. Be sure to make and go to all appointments, and call your doctor if you are having problems. It's also a good idea to know your test results and keep a list of the medicines you take. How can you care for yourself at home? · Do not try to remove earwax with cotton swabs, fingers, or other objects. This can make the blockage worse and damage the eardrum. · If your doctor recommends that you try to remove earwax at home:  ? Soften and loosen the earwax with warm mineral oil. You also can try hydrogen peroxide mixed with an equal amount of room temperature water. Place 2 drops of the fluid, warmed to body temperature, in the ear two times a day for up to 5 days. ? Once the wax is loose and soft, all that is usually needed to remove it from the ear canal is a gentle, warm shower. Direct the water into the ear, then tip your head to let the earwax drain out. Dry your ear thoroughly with a hair dryer set on low. Hold the dryer several inches from your ear. ? If the warm mineral oil and shower do not work, use an over-the-counter wax softener. Read and follow all instructions on the label. After using the wax softener, use an ear syringe to gently flush the ear. Make sure the flushing solution is body temperature. Cool or hot fluids in the ear can cause dizziness. When should you call for help?    Call your doctor now or seek immediate medical care if:    · Pus or blood drains from your ear.     · Your ears are ringing or feel full.     · You have a loss of hearing. Watch closely for changes in your health, and be sure to contact your doctor if:    · You have pain or reduced hearing after 1 week of home treatment.     · You have any new symptoms, such as nausea or balance problems. Where can you learn more? Go to https://irisnote.[employer]. org and sign in to your [employer] account. Enter F347 in the [employer] box to learn more about \"Earwax Blockage: Care Instructions. \"     If you do not have an account, please click on the \"Sign Up Now\" link. Current as of: July 1, 2021               Content Version: 13.0  © 2006-2021 Healthwise, Incorporated. Care instructions adapted under license by [facility] ([facility]). If you have questions about a medical condition or this instruction, always ask your healthcare professional. [place] [address] any warranty or liability for your use of this information.

## 2021-10-11 ENCOUNTER — HOSPITAL ENCOUNTER (OUTPATIENT)
Dept: ULTRASOUND IMAGING | Age: 46
Discharge: HOME | End: 2021-10-13
Payer: MEDICARE

## 2021-10-11 DIAGNOSIS — E04.1 THYROID NODULE: Primary | ICD-10-CM

## 2021-10-11 DIAGNOSIS — E06.9 THYROIDITIS: ICD-10-CM

## 2021-10-11 DIAGNOSIS — E04.1 THYROID NODULE GREATER THAN OR EQUAL TO 1 CM IN DIAMETER INCIDENTALLY NOTED ON IMAGING STUDY: ICD-10-CM

## 2021-10-11 PROCEDURE — 76536 US EXAM OF HEAD AND NECK: CPT

## 2021-10-19 ENCOUNTER — HOSPITAL ENCOUNTER (OUTPATIENT)
Facility: MEDICAL CENTER | Age: 46
End: 2021-10-19
Payer: MEDICARE

## 2021-10-19 ENCOUNTER — HOSPITAL ENCOUNTER (OUTPATIENT)
Age: 46
Setting detail: SPECIMEN
Discharge: HOME | End: 2021-10-19
Payer: MEDICARE

## 2021-10-19 ENCOUNTER — OFFICE VISIT (OUTPATIENT)
Dept: OBGYN CLINIC | Age: 46
End: 2021-10-19
Payer: MEDICARE

## 2021-10-19 VITALS
DIASTOLIC BLOOD PRESSURE: 72 MMHG | SYSTOLIC BLOOD PRESSURE: 114 MMHG | WEIGHT: 154 LBS | BODY MASS INDEX: 26.29 KG/M2 | HEIGHT: 64 IN

## 2021-10-19 DIAGNOSIS — L98.9 SKIN ABNORMALITY: ICD-10-CM

## 2021-10-19 DIAGNOSIS — Z01.419 WOMEN'S ANNUAL ROUTINE GYNECOLOGICAL EXAMINATION: Primary | ICD-10-CM

## 2021-10-19 DIAGNOSIS — Z12.31 ENCOUNTER FOR SCREENING MAMMOGRAM FOR BREAST CANCER: ICD-10-CM

## 2021-10-19 PROCEDURE — 99396 PREV VISIT EST AGE 40-64: CPT | Performed by: CLINICAL NURSE SPECIALIST

## 2021-10-19 PROCEDURE — G8484 FLU IMMUNIZE NO ADMIN: HCPCS | Performed by: CLINICAL NURSE SPECIALIST

## 2021-10-19 NOTE — PROGRESS NOTES
[facility]  [facility] Luann Federico  [address] [facility] [address]  Dept: [phone]        DATE OF VISIT:  10/19/21        History and Physical    Latrell Parikh    :  1975  CHIEF COMPLAINT:    Chief Complaint   Patient presents with    Annual Exam     pap 16-15 neg june n/a    Menopause     lmp 21 before that 2021                    Latrell Parikh is a 42 y.o. female who presents for annual well woman exam. Patient reports that she has dark spots on her face around her mouth, chin, lip area \" like a mask\". The patient was seen and examined. Per the patient bowels areregular. She has no voiding complaints. She denies any bloating as well as vaginal discharge. Chaperone for Intimate Exam   Chaperone was offered as part of the rooming process. Patient declined and agrees to continue with exam without a chaperone.    Chaperone: none     _____________________________________________________________________  Past Medical History:   Diagnosis Date    Depression 2014    Elevated liver enzymes 2017    Hepatitis C     Wrist fracture     right                                                                   Past Surgical History:   Procedure Laterality Date     SECTION   &      Family History   Problem Relation Age of Onset    COPD Mother     Emphysema Mother    [facility] Rheum Arthritis Mother     High Cholesterol Father     No Known Problems Brother     Breast Cancer Maternal Grandmother     Lung Cancer Maternal Grandmother     Brain Cancer Maternal Grandmother     No Known Problems Maternal Grandfather     Cancer Paternal Grandmother     Cancer Paternal Grandfather     Colon Cancer Maternal Uncle      Social History     Tobacco Use   Smoking Status Current Every Day Smoker    Packs/day: 1.00    Years: 25.00    Pack years: 25.00    Types: Cigarettes   Smokeless Tobacco Never Used     Social History     Substance and Sexual General Appearance: This  is a well developed, well nourished, well groomed female. Her BMI was reviewed. Nutritional decision making andexercise were discussed. Neurological:  The patient is alert and oriented to time,place, person, and situation    Skin:  A brief inspection of the skin revealed no rashes or lesions. Neck:  The neck was supple. Respiratory: There was unlabored respiratory effort. Lungs clear to ascultation. Cardiovascular: The patients extremities were without calf tenderness or edema. Heart with a regular rate and rhythm. Abdomen: The abdomen was soft and non-tender with no guarding, rebound or rigidity. No hernias were appreciated. Breast:   The patients breasts were symmetrical.  There were no masses, discharge or retractions noted. Self breast exams were reviewed. Pelvic Exam:  The external genitalia was with a normal appearance. The vaginal vault was normal. There were no cystocele, rectocele, or enterocele appreciated. There was no vaginal discharge. The cervix was without lesions. There was no cervical motion tenderness. The uterus was mobile, midline and regular. The adnexa no fullness, tenderness or masses appreciated. ASSESSMENT: Normal annual well woman exam    42 y.o. Female; Annual   Diagnosis Orders   1. Women's annual routine gynecological examination  PAP SMEAR   2. Encounter for screening mammogram for breast cancer  TG Screening Bilateral   3. Skin abnormality  Diana Campoverde MD, Dermatology, [place]                     PLAN:  - Pap collected. Discussed new papsmear guidelines. - Birth control Discussed. - Smoking risk factors Discussed  - Diet and exercise reviewed. - Routine healthmaintenance per patients PCP.  - Return to clinic in 1 year or earlier with questions, problems, concerns. Return for 1 year for Annual and as needed.         Electronically signed by CYNTHIA Rudolph - JACKY on 10/19/2021 at 9:38 AM

## 2021-10-21 LAB
HPV SAMPLE: NORMAL
HPV, GENOTYPE 16: NOT DETECTED
HPV, GENOTYPE 18: NOT DETECTED
HPV, HIGH RISK OTHER: NOT DETECTED
HPV, INTERPRETATION: NORMAL
SPECIMEN DESCRIPTION: NORMAL

## 2021-10-22 LAB — CYTOLOGY REPORT: NORMAL

## 2021-10-25 ENCOUNTER — TELEPHONE (OUTPATIENT)
Dept: ONCOLOGY | Age: 46
End: 2021-10-25

## 2021-10-25 ENCOUNTER — OFFICE VISIT (OUTPATIENT)
Dept: ONCOLOGY | Age: 46
End: 2021-10-25
Payer: MEDICARE

## 2021-10-25 VITALS
RESPIRATION RATE: 16 BRPM | TEMPERATURE: 97 F | DIASTOLIC BLOOD PRESSURE: 65 MMHG | SYSTOLIC BLOOD PRESSURE: 104 MMHG | WEIGHT: 157.6 LBS | BODY MASS INDEX: 27.44 KG/M2 | HEART RATE: 68 BPM

## 2021-10-25 DIAGNOSIS — D75.1 POLYCYTHEMIA: ICD-10-CM

## 2021-10-25 DIAGNOSIS — E83.19 IRON OVERLOAD: ICD-10-CM

## 2021-10-25 PROCEDURE — 99211 OFF/OP EST MAY X REQ PHY/QHP: CPT

## 2021-10-25 PROCEDURE — G8419 CALC BMI OUT NRM PARAM NOF/U: HCPCS | Performed by: INTERNAL MEDICINE

## 2021-10-25 PROCEDURE — G8484 FLU IMMUNIZE NO ADMIN: HCPCS | Performed by: INTERNAL MEDICINE

## 2021-10-25 PROCEDURE — G8427 DOCREV CUR MEDS BY ELIG CLIN: HCPCS | Performed by: INTERNAL MEDICINE

## 2021-10-25 PROCEDURE — 4004F PT TOBACCO SCREEN RCVD TLK: CPT | Performed by: INTERNAL MEDICINE

## 2021-10-25 PROCEDURE — 99214 OFFICE O/P EST MOD 30 MIN: CPT | Performed by: INTERNAL MEDICINE

## 2021-10-25 RX ORDER — 0.9 % SODIUM CHLORIDE 0.9 %
250 INTRAVENOUS SOLUTION INTRAVENOUS ONCE
Status: CANCELLED | OUTPATIENT
Start: 2021-10-26 | End: 2021-10-26

## 2021-10-25 RX ORDER — 0.9 % SODIUM CHLORIDE 0.9 %
500 INTRAVENOUS SOLUTION INTRAVENOUS ONCE
Status: CANCELLED | OUTPATIENT
Start: 2021-10-26 | End: 2021-10-26

## 2021-10-25 NOTE — TELEPHONE ENCOUNTER
ELMER HERE FOR MD VISIT  ARRANGE FOR PHLEBOTOMY X 1  NEED CBC MAC 2 AND ERYTHROPOETIN LEVELS ON PHLEBOTOMY DAY  RV IN 6 MTHS NEED CBC AND FERRITIN PRIOR TO RV  LABS CDP FERRITIN ORDERS WILL BE MAILED TO PT  PHLEBOTOMY IS ON 11/9/21 @ 2PM   MD VISIT 6 MTHS PT WILL CALL BACK TO SCHEDULE A RV APPT   AVS PRINTED W/ INSTRUCTIONS AND GIVEN TO PT ON EXIT

## 2021-10-25 NOTE — PROGRESS NOTES
palpitations. Gastrointestinal: No nausea or vomiting, abdominal pain, diarrhea or constipation. Genitourinary: Denies dysuria, hematuria, frequency, urgency or incontinence. Neurological: Denies headaches, decreased LOC, no sensory or motor focal deficits. Musculoskeletal: No arthralgia no back pain or joint swelling. Skin: There are no rashes or bleeding. Psychiatric: No anxiety, no depression. Endocrine: No diabetes or thyroid disease. Hematologic: No bleeding, no adenopathy. PHYSICAL EXAM: Shows a well appearing 42y.o.-year-old female who is not in pain or distress. Vital Signs: Blood pressure 104/65, pulse 68, temperature 97 °F (36.1 °C), temperature source Temporal, resp. rate 16, weight 157 lb 9.6 oz (71.5 kg), not currently breastfeeding. HEENT: Normocephalic and atraumatic. Pupils are equal, round, reactive to light and accommodation. Extraocular muscles are intact. Neck: Goiter Showed no JVD, no carotid bruit . Lungs: Clear to auscultation bilaterally. Heart: Regular without any murmur. Abdomen: Soft, nontender. No hepatosplenomegaly. Extremities: Lower extremities show no edema, clubbing, or cyanosis. Breasts: Examination not done today.  Neuro exam: intact cranial nerves bilaterally no motor or sensory deficit, gait is normal. Lymphatic: no adenopathy appreciated in the supraclavicular, axillary, cervical or inguinal area    REVIEW OF LABORATORY DATA:   Lab Results   Component Value Date    WBC 8.8 10/04/2021    HGB 16.0 (H) 10/04/2021    HCT 50.6 (H) 10/04/2021    MCV 98.1 10/04/2021     10/04/2021       Chemistry        Component Value Date/Time     10/04/2021 1200    K 4.5 10/04/2021 1200     10/04/2021 1200    CO2 23 10/04/2021 1200    BUN 11 10/04/2021 1200    CREATININE 0.65 10/04/2021 1200        Component Value Date/Time    CALCIUM 9.2 10/04/2021 1200    ALKPHOS 64 10/04/2021 1200    AST 37 (H) 10/04/2021 1200    ALT 42 (H) 10/04/2021 1200    BILITOT 0.35 10/04/2021 1200        Lab Results   Component Value Date    IRON 217 (H) 07/06/2020    TIBC 270 07/06/2020    FERRITIN 161 (H) 07/27/2020         REVIEW OF RADIOLOGICAL RESULTS:     IMPRESSION:   1. Polycythemia, likely secondary, stopped smoking  2. Goiter  3. Smoking addiction, finally quit smoking  4. HX hep C  5. 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 We reviewed her current and historical lab work, currently polycythemia with elevated iron saturation. The patient ferritin is rising and her hemoglobin is rising. She does quit smoking and I advised her to continue using the patches  However, the patient is very symptomatic with extreme fatigue and she is very nervous about her rising ferritin. To help with her polycythemia and iron overload, will do one-time phlebotomy. I advised her to follow-up with liver specialist and get treated for hepatitis C. This will be a one-time phlebotomy, who will check her CBC and ferritin by next visit. We will also check her erythropoietin level and JAK2 mutation to complete the work-up.

## 2021-10-25 NOTE — PATIENT INSTRUCTIONS
Arrange for phlebotomy X 1  Need cbc, Jak2 and erythropeitin levels on phlebotomy day  rv in 6 months, need cbc and ferritin prior to RV

## 2021-10-28 ENCOUNTER — TELEPHONE (OUTPATIENT)
Dept: ONCOLOGY | Age: 46
End: 2021-10-28

## 2021-10-28 NOTE — TELEPHONE ENCOUNTER
ONE TIME PHLEBOTOMY PER ORDER 10/26/2021, PER MD NOTE ON 10/25/2021.     MAC 2 MUTATION / POLYCYTHEMIA     REMOVE 500 ML BLOOD

## 2021-11-03 ENCOUNTER — HOSPITAL ENCOUNTER (OUTPATIENT)
Facility: MEDICAL CENTER | Age: 46
End: 2021-11-03
Payer: MEDICARE

## 2021-11-09 ENCOUNTER — HOSPITAL ENCOUNTER (OUTPATIENT)
Dept: INFUSION THERAPY | Facility: MEDICAL CENTER | Age: 46
Discharge: HOME | End: 2021-11-09
Payer: MEDICARE

## 2021-11-09 VITALS
SYSTOLIC BLOOD PRESSURE: 107 MMHG | HEART RATE: 67 BPM | RESPIRATION RATE: 16 BRPM | TEMPERATURE: 97.9 F | DIASTOLIC BLOOD PRESSURE: 64 MMHG

## 2021-11-09 DIAGNOSIS — D75.1 POLYCYTHEMIA: ICD-10-CM

## 2021-11-09 DIAGNOSIS — E83.19 IRON OVERLOAD: Primary | ICD-10-CM

## 2021-11-09 PROCEDURE — 99195 PHLEBOTOMY: CPT

## 2021-11-09 RX ORDER — 0.9 % SODIUM CHLORIDE 0.9 %
500 INTRAVENOUS SOLUTION INTRAVENOUS ONCE
OUTPATIENT
Start: 2021-11-09 | End: 2021-11-09

## 2021-11-09 RX ORDER — 0.9 % SODIUM CHLORIDE 0.9 %
250 INTRAVENOUS SOLUTION INTRAVENOUS ONCE
OUTPATIENT
Start: 2021-11-09 | End: 2021-11-09

## 2021-11-09 NOTE — PROGRESS NOTES
Patient arrive ambulatory for therapeutic phlebotomy treatment; slightly anxious as she is unsure what to expect. Procedure explained to patient. Vitals as charted. Phlebotomy completed to right AC using closed system; 500 ml blood removed without difficulty. Patient taking PO fluid pre and post procedure. Post vitals stable as charted; patient denies dizziness or any other problem at discharge. She is instructed to avoid strenuous activity remainder of day/evening and can remove dressing in AM.  Patient ambulate off unit per self at discharge.

## 2021-11-23 ENCOUNTER — HOSPITAL ENCOUNTER (OUTPATIENT)
Age: 46
Setting detail: SPECIMEN
Discharge: HOME | End: 2021-11-23

## 2021-11-23 ENCOUNTER — OFFICE VISIT (OUTPATIENT)
Dept: GASTROENTEROLOGY | Age: 46
End: 2021-11-23
Payer: MEDICARE

## 2021-11-23 ENCOUNTER — TELEPHONE (OUTPATIENT)
Dept: GASTROENTEROLOGY | Age: 46
End: 2021-11-23

## 2021-11-23 VITALS
TEMPERATURE: 97 F | DIASTOLIC BLOOD PRESSURE: 62 MMHG | WEIGHT: 159.6 LBS | HEIGHT: 64 IN | BODY MASS INDEX: 27.25 KG/M2 | OXYGEN SATURATION: 100 % | HEART RATE: 58 BPM | SYSTOLIC BLOOD PRESSURE: 98 MMHG

## 2021-11-23 DIAGNOSIS — R79.89 ELEVATED LFTS: ICD-10-CM

## 2021-11-23 DIAGNOSIS — B18.2 CHRONIC HEPATITIS C WITHOUT HEPATIC COMA (HCC): Primary | ICD-10-CM

## 2021-11-23 DIAGNOSIS — D75.1 POLYCYTHEMIA: ICD-10-CM

## 2021-11-23 DIAGNOSIS — B18.2 CHRONIC HEPATITIS C WITHOUT HEPATIC COMA (HCC): ICD-10-CM

## 2021-11-23 LAB
ABSOLUTE EOS #: 0.11 K/UL (ref 0–0.44)
ABSOLUTE IMMATURE GRANULOCYTE: <0.03 K/UL (ref 0–0.3)
ABSOLUTE LYMPH #: 2.63 K/UL (ref 1.1–3.7)
ABSOLUTE MONO #: 0.9 K/UL (ref 0.1–1.2)
ALBUMIN SERPL-MCNC: 4.2 G/DL (ref 3.5–5.2)
ALBUMIN/GLOBULIN RATIO: 1.2 (ref 1–2.5)
ALP BLD-CCNC: 55 U/L (ref 35–104)
ALT SERPL-CCNC: 54 U/L (ref 5–33)
AMPHETAMINE SCREEN URINE: NEGATIVE
ANION GAP SERPL CALCULATED.3IONS-SCNC: 13 MMOL/L (ref 9–17)
AST SERPL-CCNC: 48 U/L
BARBITURATE SCREEN URINE: NEGATIVE
BASOPHILS # BLD: 1 % (ref 0–2)
BASOPHILS ABSOLUTE: 0.04 K/UL (ref 0–0.2)
BENZODIAZEPINE SCREEN, URINE: NEGATIVE
BILIRUB SERPL-MCNC: 0.28 MG/DL (ref 0.3–1.2)
BILIRUBIN DIRECT: 0.09 MG/DL
BILIRUBIN, INDIRECT: 0.19 MG/DL (ref 0–1)
BUN BLDV-MCNC: 14 MG/DL (ref 6–20)
BUPRENORPHINE URINE: NORMAL
CALCIUM SERPL-MCNC: 9.3 MG/DL (ref 8.6–10.4)
CANNABINOID SCREEN URINE: NEGATIVE
CHLORIDE BLD-SCNC: 105 MMOL/L (ref 98–107)
CO2: 25 MMOL/L (ref 20–31)
COCAINE METABOLITE, URINE: NEGATIVE
CREAT SERPL-MCNC: 0.72 MG/DL (ref 0.5–0.9)
DIFFERENTIAL TYPE: NORMAL
EOSINOPHILS RELATIVE PERCENT: 1 % (ref 1–4)
ETHANOL PERCENT: <0.01 %
ETHANOL: <10 MG/DL
FERRITIN: 100 UG/L (ref 13–150)
GFR AFRICAN AMERICAN: >60 ML/MIN
GFR NON-AFRICAN AMERICAN: >60 ML/MIN
GFR SERPL CREATININE-BSD FRML MDRD: ABNORMAL ML/MIN/{1.73_M2}
GFR SERPL CREATININE-BSD FRML MDRD: ABNORMAL ML/MIN/{1.73_M2}
GLUCOSE BLD-MCNC: 81 MG/DL (ref 70–99)
HAV AB SERPL IA-ACNC: REACTIVE
HBV SURFACE AB TITR SER: 235 MIU/ML
HCT VFR BLD CALC: 46.8 % (ref 36.3–47.1)
HEMOGLOBIN: 14.7 G/DL (ref 11.9–15.1)
HEPATITIS B CORE TOTAL ANTIBODY: NONREACTIVE
HEPATITIS B SURFACE ANTIGEN: NONREACTIVE
HEPATITIS C ANTIBODY: REACTIVE
IMMATURE GRANULOCYTES: 0 %
IRON SATURATION: 37 % (ref 20–55)
IRON: 117 UG/DL (ref 37–145)
LYMPHOCYTES # BLD: 32 % (ref 24–43)
MCH RBC QN AUTO: 31 PG (ref 25.2–33.5)
MCHC RBC AUTO-ENTMCNC: 31.4 G/DL (ref 28.4–34.8)
MCV RBC AUTO: 98.7 FL (ref 82.6–102.9)
MDMA URINE: NORMAL
METHADONE SCREEN, URINE: NEGATIVE
METHAMPHETAMINE, URINE: NORMAL
MONOCYTES # BLD: 11 % (ref 3–12)
NRBC AUTOMATED: 0 PER 100 WBC
OPIATES, URINE: NEGATIVE
OXYCODONE SCREEN URINE: NEGATIVE
PDW BLD-RTO: 13.3 % (ref 11.8–14.4)
PHENCYCLIDINE, URINE: NEGATIVE
PLATELET # BLD: 223 K/UL (ref 138–453)
PLATELET ESTIMATE: NORMAL
PMV BLD AUTO: 12.8 FL (ref 8.1–13.5)
POTASSIUM SERPL-SCNC: 4.1 MMOL/L (ref 3.7–5.3)
PROPOXYPHENE, URINE: NORMAL
RBC # BLD: 4.74 M/UL (ref 3.95–5.11)
RBC # BLD: NORMAL 10*6/UL
SEG NEUTROPHILS: 55 % (ref 36–65)
SEGMENTED NEUTROPHILS ABSOLUTE COUNT: 4.56 K/UL (ref 1.5–8.1)
SODIUM BLD-SCNC: 143 MMOL/L (ref 135–144)
TEST INFORMATION: NORMAL
TOTAL IRON BINDING CAPACITY: 314 UG/DL (ref 250–450)
TOTAL PROTEIN: 7.8 G/DL (ref 6.4–8.3)
TRICYCLIC ANTIDEPRESSANTS, UR: NORMAL
UNSATURATED IRON BINDING CAPACITY: 197 UG/DL (ref 112–347)
WBC # BLD: 8.3 K/UL (ref 3.5–11.3)
WBC # BLD: NORMAL 10*3/UL

## 2021-11-23 PROCEDURE — G8427 DOCREV CUR MEDS BY ELIG CLIN: HCPCS | Performed by: INTERNAL MEDICINE

## 2021-11-23 PROCEDURE — G8484 FLU IMMUNIZE NO ADMIN: HCPCS | Performed by: INTERNAL MEDICINE

## 2021-11-23 PROCEDURE — G8419 CALC BMI OUT NRM PARAM NOF/U: HCPCS | Performed by: INTERNAL MEDICINE

## 2021-11-23 PROCEDURE — 4004F PT TOBACCO SCREEN RCVD TLK: CPT | Performed by: INTERNAL MEDICINE

## 2021-11-23 PROCEDURE — 99214 OFFICE O/P EST MOD 30 MIN: CPT | Performed by: INTERNAL MEDICINE

## 2021-11-23 ASSESSMENT — ENCOUNTER SYMPTOMS
ANAL BLEEDING: 0
DIARRHEA: 0
TROUBLE SWALLOWING: 0
ABDOMINAL PAIN: 0
RECTAL PAIN: 0
NAUSEA: 0
CONSTIPATION: 0
ABDOMINAL DISTENTION: 0
RESPIRATORY NEGATIVE: 1
SORE THROAT: 0
BLOOD IN STOOL: 0
VOMITING: 0

## 2021-11-23 NOTE — TELEPHONE ENCOUNTER
Adilson White is requesting Hep C treatment for patient. Pt pharmacy is [facility] [address]

## 2021-11-23 NOTE — PROGRESS NOTES
GI CLINIC FOLLOW UP    INTERVAL HISTORY:   No referring provider defined for this encounter. Chief Complaint   Patient presents with   [address]     Blood work for Hep C treatment that insurance will cover. HISTORY OF PRESENT ILLNESS: Ms.Angie PAULETTE Cavanaugh is a 42 y.o. female with hepatitis C. Mild fibrosis. We saw her more than a year and half ago. She was supposed to follow-up total for treatment. She did not follow-up. She has polycythemia. Unfortunately continues to smoke. Mildly elevated iron saturation. She reports no drugs. No alcohol. She wants to be treated for hepatitis C. Underwent 1 session of phlebotomy recently for polycythemia. She feels better after that. Past Medical,Family, and Social History reviewed and does not contribute to the patient presenting condition. Patient's PMH/PSH,SH,PSYCH Hx, MEDs, ALLERGIES, and ROS were all reviewed and updated in the appropriate sections.     PAST MEDICAL HISTORY:  Past Medical History:   Diagnosis Date    Depression 2014    Elevated liver enzymes 2017    Hepatitis C     Wrist fracture     right       Past Surgical History:   Procedure Laterality Date     SECTION   &        CURRENT MEDICATIONS:    Current Outpatient Medications:     nicotine (NICODERM CQ) 7 MG/24HR, Place 1 patch onto the skin every 24 hours, Disp: , Rfl:     ALLERGIES:   No Known Allergies    FAMILY HISTORY:       Problem Relation Age of Onset    COPD Mother     Emphysema Mother    Samayoa Hedger Rheum Arthritis Mother     High Cholesterol Father     No Known Problems Brother     Breast Cancer Maternal Grandmother     Lung Cancer Maternal Grandmother     Brain Cancer Maternal Grandmother     No Known Problems Maternal Grandfather     Cancer Paternal Grandmother     Cancer Paternal Grandfather     Colon Cancer Maternal Uncle          SOCIAL HISTORY:   Social History     Socioeconomic History    Marital status:      Spouse name: Not on file    Number of children: Not on file    Years of education: Not on file    Highest education level: Not on file   Occupational History    Not on file   Tobacco Use    Smoking status: Current Every Day Smoker     Packs/day: 1.00     Years: 25.00     Pack years: 25.00     Types: Cigarettes    Smokeless tobacco: Never Used   Vaping Use    Vaping Use: Never used   Substance and Sexual Activity    Alcohol use: Yes     Alcohol/week: 0.0 standard drinks     Comment: social    Drug use: No    Sexual activity: Not Currently     Partners: Male     Birth control/protection: Condom   Other Topics Concern    Not on file   Social History Narrative    Not on file     Social Determinants of Health     Financial Resource Strain: Low Risk     Difficulty of Paying Living Expenses: Not hard at all   Food Insecurity: No Food Insecurity    Worried About [employer] [employer] in the Last Year: Never true    [facility] in the Last Year: Never true   Transportation Needs:     Lack of Transportation (Medical): Not on file    Lack of Transportation (Non-Medical):  Not on file   Physical Activity:     Days of Exercise per Week: Not on file    Minutes of Exercise per Session: Not on file   Stress:     Feeling of Stress : Not on file   Social Connections:     Frequency of Communication with Friends and Family: Not on file    Frequency of Social Gatherings with Friends and Family: Not on file    Attends Mandaeism Services: Not on file    Active Member of Clubs or Organizations: Not on file    Attends Club or Organization Meetings: Not on file    Marital Status: Not on file   Intimate Partner Violence:     Fear of Current or Ex-Partner: Not on file    Emotionally Abused: Not on file    Physically Abused: Not on file    Sexually Abused: Not on file   Housing Stability:     Unable to Pay for Housing in the Last Year: Not on file    Number of Places Lived in the Last Year: Not on file    Unstable Housing in the Last Year: Not on file       REVIEW OF SYSTEMS: A 12-point review of systemswas obtained and pertinent positives and negatives were enumerated above in the history of present illness. All other reviewed systems / symptoms were negative. Review of Systems   Constitutional: Negative for appetite change, fatigue and unexpected weight change. HENT: Negative for sore throat and trouble swallowing. Respiratory: Negative. Cardiovascular: Negative. Gastrointestinal: Negative for abdominal distention, abdominal pain, anal bleeding, blood in stool, constipation, diarrhea, nausea, rectal pain and vomiting. Musculoskeletal: Negative. Neurological: Positive for dizziness, light-headedness and headaches. Negative for weakness. LABORATORY DATA: Reviewed  Lab Results   Component Value Date    WBC 8.8 10/04/2021    HGB 16.0 (H) 10/04/2021    HCT 50.6 (H) 10/04/2021    MCV 98.1 10/04/2021     10/04/2021     10/04/2021    K 4.5 10/04/2021     10/04/2021    CO2 23 10/04/2021    BUN 11 10/04/2021    CREATININE 0.65 10/04/2021    LABALBU 4.3 10/04/2021    BILITOT 0.35 10/04/2021    ALKPHOS 64 10/04/2021    AST 37 (H) 10/04/2021    ALT 42 (H) 10/04/2021         Lab Results   Component Value Date    RBC 5.16 (H) 10/04/2021    HGB 16.0 (H) 10/04/2021    MCV 98.1 10/04/2021    MCH 31.0 10/04/2021    MCHC 31.6 10/04/2021    RDW 13.2 10/04/2021    MPV 12.5 10/04/2021    BASOPCT 0 07/27/2020    LYMPHSABS 1.81 07/27/2020    MONOSABS 0.73 07/27/2020    NEUTROABS 5.66 07/27/2020    EOSABS 0.07 07/27/2020    BASOSABS 0.03 07/27/2020         DIAGNOSTIC TESTING:     No results found. PHYSICAL EXAMINATION: Vital signs reviewed per the nursing documentation. Ht 5' 3.5\" (1.613 m)   Wt 159 lb 9.6 oz (72.4 kg)   BMI 27.83 kg/m²   Body mass index is 27.83 kg/m². Physical Exam      I personally reviewed the nurse's notes and documentation and I agree with her notes.     General: alert, appears stated age and cooperative Psych: Normal. and Alert and oriented, appropriate affect. . Normal affect. Mentation normal  HEENT: PERRLA. Clear conjunctivae and sclerae. Moist oral mucosae, no lesions or ulcers. The neck is supple, without lymphadenopathy or jugular venous distension. No masses. Normal thyroid. Cardiovascular: S1 S2 RRR no rubs or murmurs. Pulmonary: clear BL. No accessory muscle usage. Abdominal Exam: Soft, NT ND, no hepato or spleno megaly, +BS, no ascites. IMPRESSION: Ms. Quentin Nolan is a 42 y.o. female with hepatitis C. Mild fibrosis. Elevated LFTs. Polycythemia. Elevated iron saturation. Very unlikely to be hemochromatosis. She continues to follow-up with hematology. We will recheck labs today including iron studies. We will apply for hepatitis C treatment. Follow-up in 6 weeks. Thank you for allowing me to participate in the care of Ms. Quentin Nolan. For any further questions please do not hesitate to contact me. I have reviewed and agree with the ROS entered by the MA/LPN. Note is dictated utilizing voice recognition software. Unfortunately this leads to occasional typographical errors. Please contact our office if you have any questions.     Tete Ybarra MD  [facility]  O: #[phone]

## 2021-11-26 LAB
ALANINE AMINOTRANSFERASE, FIBROMETER: 60 U/L (ref 5–40)
ALPHA-2-MACROGLOBULIN, FIBROMETER: 303 MG/DL (ref 131–293)
ASPARTATE AMINOTRANSFERASE, FIBROMETER: 51 U/L (ref 9–40)
CIRRHOMETER PATIENT SCORE: 0
DIRECT EXAM: ABNORMAL
EER FIBROMETER REPORT: ABNORMAL
FIBROMETER INTERPRETATION: ABNORMAL
FIBROMETER PATIENT SCORE: 0.26
FIBROMETER PLATELET COUNT: 206
FIBROMETER PROTHROMBIN INDEX: 114 % (ref 90–120)
FIBROSIS METAVIR CLASSIFICATION: ABNORMAL
GAMMA GLUTAMYL TRANSFERASE, FIBROMETER: 11 U/L (ref 7–33)
INFLAMETER METAVIR CLASSIFICATION: ABNORMAL
INFLAMETER PATIENT SCORE: 0.38
Lab: ABNORMAL
SPECIMEN DESCRIPTION: ABNORMAL
UREA NITROGEN, FIBROMETER: 14 MG/DL (ref 7–20)

## 2021-11-29 ENCOUNTER — HOSPITAL ENCOUNTER (OUTPATIENT)
Dept: ULTRASOUND IMAGING | Age: 46
Discharge: HOME | End: 2021-12-01
Payer: MEDICARE

## 2021-11-29 DIAGNOSIS — D75.1 POLYCYTHEMIA: ICD-10-CM

## 2021-11-29 DIAGNOSIS — B18.2 CHRONIC HEPATITIS C WITHOUT HEPATIC COMA (HCC): ICD-10-CM

## 2021-11-29 DIAGNOSIS — R79.89 ELEVATED LFTS: ICD-10-CM

## 2021-11-29 LAB
HCV QUANTITATIVE: NORMAL
HEPATITIS C GENOTYPE: NORMAL

## 2021-11-29 PROCEDURE — 76705 ECHO EXAM OF ABDOMEN: CPT

## 2021-12-01 ENCOUNTER — PATIENT MESSAGE (OUTPATIENT)
Dept: FAMILY MEDICINE CLINIC | Age: 46
End: 2021-12-01

## 2021-12-01 DIAGNOSIS — E06.9 THYROIDITIS: Primary | ICD-10-CM

## 2021-12-01 RX ORDER — LEVOTHYROXINE SODIUM 0.03 MG/1
25 TABLET ORAL DAILY
Qty: 30 TABLET | Refills: 1 | Status: SHIPPED | OUTPATIENT
Start: 2021-12-01 | End: 2021-12-22 | Stop reason: SINTOL

## 2021-12-01 NOTE — TELEPHONE ENCOUNTER
From: Ashwin Espana  To: Deanna Barbour  Sent: 12/1/2021 9:23 AM EST  Subject: Levothyroxine    I have appointment for specialist Feb 8th. I was wanting to try this prescription again. Can you write me one ?

## 2021-12-03 RX ORDER — VELPATASVIR AND SOFOSBUVIR 100; 400 MG/1; MG/1
1 TABLET, FILM COATED ORAL DAILY
Qty: 28 TABLET | Refills: 2 | Status: SHIPPED | OUTPATIENT
Start: 2021-12-03 | End: 2022-03-14

## 2021-12-03 NOTE — TELEPHONE ENCOUNTER
Writer prepared and signed script per Dr Rosalind Belle.   Script sent to [facility] for PA

## 2021-12-14 ENCOUNTER — HOSPITAL ENCOUNTER (OUTPATIENT)
Dept: MAMMOGRAPHY | Age: 46
Discharge: HOME | End: 2021-12-16
Payer: MEDICARE

## 2021-12-14 DIAGNOSIS — Z12.31 ENCOUNTER FOR SCREENING MAMMOGRAM FOR BREAST CANCER: ICD-10-CM

## 2021-12-14 PROCEDURE — 77063 BREAST TOMOSYNTHESIS BI: CPT

## 2021-12-22 DIAGNOSIS — E06.9 THYROIDITIS: Primary | ICD-10-CM

## 2021-12-22 RX ORDER — LEVOTHYROXINE AND LIOTHYRONINE 19; 4.5 UG/1; UG/1
30 TABLET ORAL DAILY
Qty: 30 TABLET | Refills: 1 | Status: SHIPPED | OUTPATIENT
Start: 2021-12-22

## 2022-01-10 ENCOUNTER — TELEPHONE (OUTPATIENT)
Dept: GASTROENTEROLOGY | Age: 47
End: 2022-01-10

## 2022-01-10 NOTE — TELEPHONE ENCOUNTER
Pt romeom stating she had questions regarding paperwork being sent, did not specify what paperwork, please advise.

## 2022-01-11 NOTE — TELEPHONE ENCOUNTER
Writer looked into the patient's chart and seen that the doctor requested that the patient start Hep C medication. A PA was sent to [facility] on 12/3/21. Writer sent an email to Rosette Lim from [facility] and asked to be updated on where in the process is the patient at to receive the medication. Patient will be notified when reply is received.

## 2022-02-03 NOTE — TELEPHONE ENCOUNTER
Writer received updated from [place] on 1/11/22. Darlene Beltran  1975  [phone]  Referral was sent on 12/3/21  This was approved and transferred to the [facility] location per insurance. She has received 2 shipments as of earlier this month.

## 2022-02-07 ENCOUNTER — OFFICE VISIT (OUTPATIENT)
Dept: GASTROENTEROLOGY | Age: 47
End: 2022-02-07
Payer: MEDICARE

## 2022-02-07 VITALS — SYSTOLIC BLOOD PRESSURE: 103 MMHG | BODY MASS INDEX: 27.9 KG/M2 | DIASTOLIC BLOOD PRESSURE: 65 MMHG | WEIGHT: 160 LBS

## 2022-02-07 DIAGNOSIS — R79.89 ELEVATED LFTS: ICD-10-CM

## 2022-02-07 DIAGNOSIS — B18.2 CHRONIC HEPATITIS C WITHOUT HEPATIC COMA (HCC): Primary | ICD-10-CM

## 2022-02-07 PROCEDURE — G8427 DOCREV CUR MEDS BY ELIG CLIN: HCPCS | Performed by: INTERNAL MEDICINE

## 2022-02-07 PROCEDURE — G8484 FLU IMMUNIZE NO ADMIN: HCPCS | Performed by: INTERNAL MEDICINE

## 2022-02-07 PROCEDURE — 99213 OFFICE O/P EST LOW 20 MIN: CPT | Performed by: INTERNAL MEDICINE

## 2022-02-07 PROCEDURE — G8419 CALC BMI OUT NRM PARAM NOF/U: HCPCS | Performed by: INTERNAL MEDICINE

## 2022-02-07 PROCEDURE — 4004F PT TOBACCO SCREEN RCVD TLK: CPT | Performed by: INTERNAL MEDICINE

## 2022-02-07 ASSESSMENT — ENCOUNTER SYMPTOMS
GASTROINTESTINAL NEGATIVE: 1
RESPIRATORY NEGATIVE: 1
EYES NEGATIVE: 1
ALLERGIC/IMMUNOLOGIC NEGATIVE: 1

## 2022-02-07 NOTE — PROGRESS NOTES
GI CLINIC FOLLOW UP    INTERVAL HISTORY:   No referring provider defined for this encounter. Chief Complaint   Patient presents with    Follow-up           HISTORY OF PRESENT ILLNESS: Ms.Angie PAULETTE Colunga is a 42 y.o. female with hepatitis C. She is on treatment. She has 1 more month. Continues to smoke. No drinking. She has mild erythrocytosis. She has had phlebotomy in the past.  She is a homozygote for H63D. Past Medical,Family, and Social History reviewed and does not contribute to the patient presenting condition. Patient's PMH/PSH,SH,PSYCH Hx, MEDs, ALLERGIES, and ROS were all reviewed and updated in the appropriate sections.     PAST MEDICAL HISTORY:  Past Medical History:   Diagnosis Date    Depression 2014    Elevated liver enzymes 2017    Hepatitis C     Wrist fracture     right       Past Surgical History:   Procedure Laterality Date     SECTION   &        CURRENT MEDICATIONS:    Current Outpatient Medications:     thyroid (ARMOUR THYROID) 30 MG tablet, Take 1 tablet by mouth daily, Disp: 30 tablet, Rfl: 1    Sofosbuvir-Velpatasvir 400-100 MG TABS, Take 1 tablet by mouth daily, Disp: 28 tablet, Rfl: 2    nicotine (NICODERM CQ) 7 MG/24HR, Place 1 patch onto the skin every 24 hours, Disp: , Rfl:     ALLERGIES:   No Known Allergies    FAMILY HISTORY:       Problem Relation Age of Onset    COPD Mother    [facility] Emphysema Mother    [facility] Rheum Arthritis Mother     High Cholesterol Father     No Known Problems Brother     Breast Cancer Maternal Grandmother     Lung Cancer Maternal Grandmother     Brain Cancer Maternal Grandmother     No Known Problems Maternal Grandfather     Cancer Paternal Grandmother         breast    Cancer Paternal Grandfather     Colon Cancer Maternal Uncle          SOCIAL HISTORY:   Social History     Socioeconomic History    Marital status:      Spouse name: Not on file    Number of children: Not on file    Years of education: Not on file    Highest education level: Not on file   Occupational History    Not on file   Tobacco Use    Smoking status: Current Every Day Smoker     Packs/day: 1.00     Years: 25.00     Pack years: 25.00     Types: Cigarettes    Smokeless tobacco: Never Used   Vaping Use    Vaping Use: Never used   Substance and Sexual Activity    Alcohol use: Yes     Alcohol/week: 0.0 standard drinks     Comment: social    Drug use: No    Sexual activity: Not Currently     Partners: Male     Birth control/protection: Condom   Other Topics Concern    Not on file   Social History Narrative    Not on file     Social Determinants of Health     Financial Resource Strain: Low Risk     Difficulty of Paying Living Expenses: Not hard at all   Food Insecurity: No Food Insecurity    Worried About [address] in the Last Year: Never true    [facility] in the Last Year: Never true   Transportation Needs:     Lack of Transportation (Medical): Not on file    Lack of Transportation (Non-Medical):  Not on file   Physical Activity:     Days of Exercise per Week: Not on file    Minutes of Exercise per Session: Not on file   Stress:     Feeling of Stress : Not on file   Social Connections:     Frequency of Communication with Friends and Family: Not on file    Frequency of Social Gatherings with Friends and Family: Not on file    Attends [employer] Services: Not on file    Active Member of [address] [place] [address] or Organizations: Not on file    Attends Club or Organization Meetings: Not on file    Marital Status: Not on file   Intimate Partner Violence:     Fear of Current or Ex-Partner: Not on file    Emotionally Abused: Not on file    Physically Abused: Not on file    Sexually Abused: Not on file   Housing Stability:     Unable to Pay for Housing in the Last Year: Not on file    Number of [place] in the Last Year: Not on file    Unstable Housing in the Last Year: Not on file       REVIEW OF SYSTEMS: A 12-point review of systemswas obtained and pertinent positives and negatives were enumerated above in the history of present illness. All other reviewed systems / symptoms were negative. Review of Systems   Constitutional: Negative. HENT: Negative. Eyes: Negative. Respiratory: Negative. Cardiovascular: Negative. Gastrointestinal: Negative. Endocrine: Negative. Genitourinary: Negative. Musculoskeletal: Negative. Skin: Negative. Allergic/Immunologic: Negative. Neurological: Negative. Hematological: Negative. Psychiatric/Behavioral: Negative. LABORATORY DATA: Reviewed  Lab Results   Component Value Date    WBC 8.3 11/23/2021    HGB 14.7 11/23/2021    HCT 46.8 11/23/2021    MCV 98.7 11/23/2021     11/23/2021     11/23/2021    K 4.1 11/23/2021     11/23/2021    CO2 25 11/23/2021    BUN 14 11/23/2021    CREATININE 0.72 11/23/2021    LABALBU 4.2 11/23/2021    BILITOT 0.28 (L) 11/23/2021    ALKPHOS 55 11/23/2021    AST 48 (H) 11/23/2021    ALT 54 (H) 11/23/2021         Lab Results   Component Value Date    RBC 4.74 11/23/2021    HGB 14.7 11/23/2021    MCV 98.7 11/23/2021    MCH 31.0 11/23/2021    MCHC 31.4 11/23/2021    RDW 13.3 11/23/2021    MPV 12.8 11/23/2021    BASOPCT 1 11/23/2021    LYMPHSABS 2.63 11/23/2021    MONOSABS 0.90 11/23/2021    NEUTROABS 4.56 11/23/2021    EOSABS 0.11 11/23/2021    BASOSABS 0.04 11/23/2021         DIAGNOSTIC TESTING:     No results found. PHYSICAL EXAMINATION: Vital signs reviewed per the nursing documentation. There were no vitals taken for this visit. There is no height or weight on file to calculate BMI. Physical Exam    I personally reviewed the nurse's notes and documentation and I agree with her notes. General: alert, appears stated age and cooperative Psych: Normal. and Alert and oriented, appropriate affect. . Normal affect. Mentation normal  HEENT: PERRLA. Clear conjunctivae and sclerae.    Moist oral mucosae, no lesions or ulcers. The neck is supple, without lymphadenopathy or jugular venous distension. No masses. Normal thyroid. Cardiovascular: S1 S2 RRR no rubs or murmurs. Pulmonary: clear BL. No accessory muscle usage. Abdominal Exam: Soft, NT ND, no hepato or spleno megaly, +BS, no ascites. IMPRESSION: Ms. Tra Steward is a 42 y.o. female with hepatitis C. She is on treatment. We will need to check viral load close to mid June to confirm SVR. We discussed the issues with iron overload and erythrocytosis. We strongly recommended the patient to quit smoking. She is at slightly increased risk of iron overload problems given her homozygosity for H63D. We will see her in 6 months      Thank you for allowing me to participate in the care of Ms. Tra Steward. For any further questions please do not hesitate to contact me. I have reviewed and agree with the ROS entered by the MA/LPN. Note is dictated utilizing voice recognition software. Unfortunately this leads to occasional typographical errors. Please contact our office if you have any questions.     Tiffany Herrera MD  [facility]  O: #[phone]

## 2022-02-15 ENCOUNTER — HOSPITAL ENCOUNTER (OUTPATIENT)
Age: 47
Setting detail: SPECIMEN
Discharge: HOME | End: 2022-02-15

## 2022-02-15 DIAGNOSIS — D75.1 POLYCYTHEMIA: ICD-10-CM

## 2022-02-15 DIAGNOSIS — E83.19 IRON OVERLOAD: ICD-10-CM

## 2022-02-15 LAB — FERRITIN: 55 UG/L (ref 13–150)

## 2022-03-14 ENCOUNTER — OFFICE VISIT (OUTPATIENT)
Dept: FAMILY MEDICINE CLINIC | Age: 47
End: 2022-03-14
Payer: MEDICARE

## 2022-03-14 ENCOUNTER — HOSPITAL ENCOUNTER (OUTPATIENT)
Age: 47
Setting detail: SPECIMEN
Discharge: HOME | End: 2022-03-14

## 2022-03-14 VITALS
OXYGEN SATURATION: 98 % | WEIGHT: 161.4 LBS | BODY MASS INDEX: 28.14 KG/M2 | SYSTOLIC BLOOD PRESSURE: 104 MMHG | DIASTOLIC BLOOD PRESSURE: 70 MMHG | HEART RATE: 73 BPM

## 2022-03-14 DIAGNOSIS — H01.136 EYELID DERMATITIS, ECZEMATOUS, LEFT: Primary | ICD-10-CM

## 2022-03-14 LAB
T3 FREE: 3.49 PG/ML (ref 2.02–4.43)
THYROXINE, FREE: 1.1 NG/DL (ref 0.93–1.7)
TSH SERPL DL<=0.05 MIU/L-ACNC: 8.53 MIU/L (ref 0.3–5)

## 2022-03-14 PROCEDURE — G8419 CALC BMI OUT NRM PARAM NOF/U: HCPCS | Performed by: NURSE PRACTITIONER

## 2022-03-14 PROCEDURE — 99213 OFFICE O/P EST LOW 20 MIN: CPT | Performed by: NURSE PRACTITIONER

## 2022-03-14 PROCEDURE — G8427 DOCREV CUR MEDS BY ELIG CLIN: HCPCS | Performed by: NURSE PRACTITIONER

## 2022-03-14 PROCEDURE — G8484 FLU IMMUNIZE NO ADMIN: HCPCS | Performed by: NURSE PRACTITIONER

## 2022-03-14 PROCEDURE — 4004F PT TOBACCO SCREEN RCVD TLK: CPT | Performed by: NURSE PRACTITIONER

## 2022-03-14 RX ORDER — CRISABOROLE 20 MG/G
1 OINTMENT TOPICAL 2 TIMES DAILY
Qty: 1 EACH | Refills: 1 | Status: SHIPPED | OUTPATIENT
Start: 2022-03-14

## 2022-03-14 ASSESSMENT — ENCOUNTER SYMPTOMS
EYE PAIN: 0
SHORTNESS OF BREATH: 0
RHINORRHEA: 0

## 2022-03-14 NOTE — PROGRESS NOTES
blaise Pereyra (:  1975) is a 42 y.o. female,Established patient, here for evaluation of the following chief complaint(s):  Rash         ASSESSMENT/PLAN:  1. Eyelid dermatitis, eczematous, left  -     Kelly MD Vishal, Allergy & Immunology, [place]  -     Crisaborole (EUCRISA) 2 % OINT; Apply 1 Dose topically in the morning and at bedtime, Disp-1 each, R-1Normal      No follow-ups on file. Subjective   SUBJECTIVE/OBJECTIVE:  Rash  This is a new problem. The current episode started more than 1 month ago. The problem has been waxing and waning since onset. The affected locations include the left eye. The rash is characterized by redness and bruising. She was exposed to nothing. Pertinent negatives include no eye pain, fever, rhinorrhea or shortness of breath. Treatments tried: aloe. The treatment provided significant relief. Review of Systems   Constitutional: Negative for fever. HENT: Negative for rhinorrhea. Eyes: Negative for pain. Respiratory: Negative for shortness of breath. Skin: Positive for rash. Objective    Vitals:    22 0825   BP: 104/70   Pulse: 73   SpO2: 98%         Physical Exam  Constitutional:       Appearance: She is well-developed. HENT:      Right Ear: External ear normal.      Left Ear: External ear normal.      Nose: Nose normal.   Eyes:      Comments: Rash currently not present- some form of dermatitis identified through gemma phone pic, will give referral to allergist.    Cardiovascular:      Rate and Rhythm: Normal rate and regular rhythm. Heart sounds: Normal heart sounds, S1 normal and S2 normal.   Pulmonary:      Effort: Pulmonary effort is normal. No respiratory distress. Breath sounds: Normal breath sounds. Musculoskeletal:         General: No deformity. Normal range of motion. Cervical back: Full passive range of motion without pain and normal range of motion. Skin:     General: Skin is warm and dry. Neurological:      Mental Status: She is alert and oriented to person, place, and time. An electronic signature was used to authenticate this note.     --Guero Pineda, APRN - CNP

## 2022-03-14 NOTE — PROGRESS NOTES
Patient is present complaining of rash round left eye  Patient states this comes and goes  States when it goes away it looks like a bruise  States it feels \"raw\"  States she noticed this in February  States she puts aloe on it with relief

## 2022-03-16 LAB
THYROGLOBULIN AB: 105 IU/ML (ref 0–40)
THYROID PEROXIDASE (TPO) AB: 1033 IU/ML (ref 0–25)

## 2022-03-17 ENCOUNTER — PATIENT MESSAGE (OUTPATIENT)
Dept: FAMILY MEDICINE CLINIC | Age: 47
End: 2022-03-17

## 2022-03-17 DIAGNOSIS — E06.9 THYROIDITIS: ICD-10-CM

## 2022-03-17 RX ORDER — LEVOTHYROXINE AND LIOTHYRONINE 19; 4.5 UG/1; UG/1
30 TABLET ORAL DAILY
Qty: 30 TABLET | Refills: 1 | Status: CANCELLED | OUTPATIENT
Start: 2022-03-17

## 2022-03-17 NOTE — TELEPHONE ENCOUNTER
From: Germania Faulkner  To: Rajesh Culver  Sent: 3/17/2022 7:27 AM EDT  Subject: Refill    Can I have a refill for armour thyroid please.  Thank you

## 2022-04-01 ENCOUNTER — TELEPHONE (OUTPATIENT)
Dept: ONCOLOGY | Age: 47
End: 2022-04-01

## 2022-04-01 NOTE — TELEPHONE ENCOUNTER
PT IS SCHEDULED 05/02/22. DR Alex Sharma CLOSED HIS SCHEDULE ON 05/02/22. WRITER CALLED PT TO RESCHEDULE APPT, NO ANSWER LEFT VM.

## 2022-04-12 ENCOUNTER — HOSPITAL ENCOUNTER (OUTPATIENT)
Facility: MEDICAL CENTER | Age: 47
End: 2022-04-12

## 2022-04-12 ENCOUNTER — HOSPITAL ENCOUNTER (OUTPATIENT)
Age: 47
Setting detail: SPECIMEN
Discharge: HOME | End: 2022-04-12

## 2022-04-12 DIAGNOSIS — D75.1 POLYCYTHEMIA: ICD-10-CM

## 2022-04-12 DIAGNOSIS — E83.19 IRON OVERLOAD: ICD-10-CM

## 2022-04-12 LAB
ABSOLUTE EOS #: 0.12 K/UL (ref 0–0.44)
ABSOLUTE IMMATURE GRANULOCYTE: <0.03 K/UL (ref 0–0.3)
ABSOLUTE LYMPH #: 2.4 K/UL (ref 1.1–3.7)
ABSOLUTE MONO #: 0.73 K/UL (ref 0.1–1.2)
BASOPHILS # BLD: 1 % (ref 0–2)
BASOPHILS ABSOLUTE: 0.04 K/UL (ref 0–0.2)
EOSINOPHILS RELATIVE PERCENT: 1 % (ref 1–4)
HCT VFR BLD CALC: 45.1 % (ref 36.3–47.1)
HEMOGLOBIN: 15 G/DL (ref 11.9–15.1)
IMMATURE GRANULOCYTES: 0 %
LYMPHOCYTES # BLD: 29 % (ref 24–43)
MCH RBC QN AUTO: 30.5 PG (ref 25.2–33.5)
MCHC RBC AUTO-ENTMCNC: 33.3 G/DL (ref 28.4–34.8)
MCV RBC AUTO: 91.9 FL (ref 82.6–102.9)
MONOCYTES # BLD: 9 % (ref 3–12)
NRBC AUTOMATED: 0 PER 100 WBC
PDW BLD-RTO: 13.6 % (ref 11.8–14.4)
PLATELET # BLD: 199 K/UL (ref 138–453)
PMV BLD AUTO: 12.7 FL (ref 8.1–13.5)
RBC # BLD: 4.91 M/UL (ref 3.95–5.11)
SEG NEUTROPHILS: 60 % (ref 36–65)
SEGMENTED NEUTROPHILS ABSOLUTE COUNT: 5.02 K/UL (ref 1.5–8.1)
WBC # BLD: 8.3 K/UL (ref 3.5–11.3)

## 2022-04-14 LAB — ERYTHROPOIETIN: 6 MU/ML (ref 4–27)

## 2022-04-18 ENCOUNTER — OFFICE VISIT (OUTPATIENT)
Dept: ONCOLOGY | Age: 47
End: 2022-04-18
Payer: MEDICARE

## 2022-04-18 ENCOUNTER — TELEPHONE (OUTPATIENT)
Dept: ONCOLOGY | Age: 47
End: 2022-04-18

## 2022-04-18 VITALS
WEIGHT: 162.7 LBS | DIASTOLIC BLOOD PRESSURE: 65 MMHG | HEART RATE: 70 BPM | BODY MASS INDEX: 28.37 KG/M2 | SYSTOLIC BLOOD PRESSURE: 103 MMHG | RESPIRATION RATE: 20 BRPM | TEMPERATURE: 97 F

## 2022-04-18 DIAGNOSIS — E83.19 IRON OVERLOAD: Primary | ICD-10-CM

## 2022-04-18 DIAGNOSIS — D75.1 POLYCYTHEMIA: ICD-10-CM

## 2022-04-18 DIAGNOSIS — D75.1 POLYCYTHEMIA, SECONDARY: ICD-10-CM

## 2022-04-18 PROCEDURE — 99211 OFF/OP EST MAY X REQ PHY/QHP: CPT

## 2022-04-18 PROCEDURE — 4004F PT TOBACCO SCREEN RCVD TLK: CPT | Performed by: INTERNAL MEDICINE

## 2022-04-18 PROCEDURE — G8427 DOCREV CUR MEDS BY ELIG CLIN: HCPCS | Performed by: INTERNAL MEDICINE

## 2022-04-18 PROCEDURE — 99214 OFFICE O/P EST MOD 30 MIN: CPT | Performed by: INTERNAL MEDICINE

## 2022-04-18 PROCEDURE — G8419 CALC BMI OUT NRM PARAM NOF/U: HCPCS | Performed by: INTERNAL MEDICINE

## 2022-04-18 NOTE — TELEPHONE ENCOUNTER
[facility] HERE FOR MD VISIT  RV IN 1 YR NEED CDP CMP FERRITIN PRIOR TO RV  LABS CDP CMP FERRITIN ORDERS GIVEN TO PT ON EXIT TO BE DONE 4/2023  MD VISIT 4/2023  AVS PRINTED W/ INSTUCTIONS AND GIVEN TO PT ON EXIT

## 2022-04-18 NOTE — PROGRESS NOTES
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 female who is referred to us for polycythemia. She denies any previous history of polycythemia or hemachromatosis. She does smoke a pack daily and has for the last 30 years. She has goiter and reports medication makes her sleepy and she hasn't been taking. She complains of throat swelling and soreness. She has been diagnosed with hepatitis C, underwent treatment. She denies history of illegal drug use. INTERIM HISTORY: She comes in today for a follow-up for hemachromatosis. She reports she underwent hep C treatment. She has recently been started on treatment for thyroid and had FNA to the thyroid She felt improved after phlebotomy but is currently fatigued. She resumed smoking, half pack daily. PAST MEDICAL HISTORY: has a past medical history of Depression, Elevated liver enzymes, Hepatitis C, and Wrist fracture. PAST SURGICAL HISTORY: has a past surgical history that includes  section ( & ). CURRENT MEDICATIONS:  has a current medication list which includes the following prescription(s): thyroid and eucrisa. ALLERGIES:  has No Known Allergies. FAMILY HISTORY: Negative for any hematological or oncological conditions. SOCIAL HISTORY:  reports that she has been smoking cigarettes. She has a 25.00 pack-year smoking history. She has never used smokeless tobacco. She reports current alcohol use. She reports that she does not use drugs. REVIEW OF SYSTEMS:   General: No fever or night sweats. Weight is stable.  +fatigue  ENT: No double or blurred vision, no tinnitus or hearing problem, no dysphagia  Respiratory: No chest pain, no shortness of breath, no cough or hemoptysis. Cardiovascular: Denies chest pain, PND or orthopnea. No L E swelling or palpitations. Gastrointestinal: No nausea or vomiting, abdominal pain, diarrhea or constipation. Genitourinary: Denies dysuria, hematuria, frequency, urgency or incontinence. Neurological: Denies headaches, decreased LOC, no sensory or motor focal deficits. Musculoskeletal: No arthralgia no back pain or joint swelling. Skin: There are no rashes or bleeding. Psychiatric: No anxiety, no depression. Endocrine: No diabetes or thyroid disease. Hematologic: No bleeding, no adenopathy. PHYSICAL EXAM: Shows a well appearing 42y.o.-year-old female who is not in pain or distress. Vital Signs: Blood pressure 103/65, pulse 70, temperature 97 °F (36.1 °C), temperature source Temporal, resp. rate 20, weight 162 lb 11.2 oz (73.8 kg), not currently breastfeeding. HEENT: Normocephalic and atraumatic. Pupils are equal, round, reactive to light and accommodation. Extraocular muscles are intact. Neck: Goiter Showed no JVD, no carotid bruit . Lungs: Clear to auscultation bilaterally. Heart: Regular without any murmur. Abdomen: Soft, nontender. No hepatosplenomegaly. Extremities: Lower extremities show no edema, clubbing, or cyanosis. Breasts: Examination not done today.  Neuro exam: intact cranial nerves bilaterally no motor or sensory deficit, gait is normal. Lymphatic: no adenopathy appreciated in the supraclavicular, axillary, cervical or inguinal area    REVIEW OF LABORATORY DATA:   Lab Results   Component Value Date    WBC 8.3 04/12/2022    HGB 15.0 04/12/2022    HCT 45.1 04/12/2022    MCV 91.9 04/12/2022     04/12/2022       Chemistry        Component Value Date/Time     11/23/2021 1500    K 4.1 11/23/2021 1500     11/23/2021 1500    CO2 25 11/23/2021 1500    BUN 14 11/23/2021 1500    CREATININE 0.72 11/23/2021 1500        Component Value Date/Time    CALCIUM 9.3 11/23/2021 1500    ALKPHOS 55 11/23/2021 1500 AST 48 (H) 11/23/2021 1500    ALT 54 (H) 11/23/2021 1500    BILITOT 0.28 (L) 11/23/2021 1500        Lab Results   Component Value Date    IRON 117 11/23/2021    TIBC 314 11/23/2021    FERRITIN 55 02/15/2022     Erythropoietin 4 - 27 mU/mL 6          REVIEW OF RADIOLOGICAL RESULTS:     IMPRESSION:   1. Polycythemia, likely secondary, stopped smoking  2. Goiter  3. Smoking addiction, finally quit smoking, resumed  4. History of hep C  5. 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 We reviewed her lab work, her HGB and ferritin are normalized following phlebotomy and controlling of her diet. 2. She has undergone treatment for hepatitis C which led to improvement in her iron storage as her liver is less inflamed  3. I counseled her on smoking cessation and I discussed option for smoking cessation program, she declined at this time. 4. We discussed her thyroid treatment. 5. She may take B-complex. 6. Return in 1 year, sooner if clinically indicated. Scribe Attestation   This note was created by Jewel Mcdonough acting as scribe for the physician signing this note  Electronically Signed  Jewel Mcdonough, 4/18/2022  Scribe, [employer]. Attending Attestation   Note was reviewed and edited.   I am in agreement with the note as entered    Yaima Moran MD  Hematologist/Medical [id number] [facility] hematology oncology physicians

## 2022-04-24 LAB
JAK2 QNT, SOURCE: NORMAL
V617 MUTATION, PERCENT: 0 %
V617F MUTATION, QNT: NOT DETECTED

## 2022-05-25 ENCOUNTER — HOSPITAL ENCOUNTER (OUTPATIENT)
Age: 47
Setting detail: SPECIMEN
Discharge: HOME | End: 2022-05-25

## 2022-05-25 DIAGNOSIS — R79.89 ELEVATED LFTS: ICD-10-CM

## 2022-05-25 DIAGNOSIS — B18.2 CHRONIC HEPATITIS C WITHOUT HEPATIC COMA (HCC): ICD-10-CM

## 2022-05-26 LAB
HEPATITIS C RNA PCR QUANT: NOT DETECTED
SOURCE: NORMAL

## 2022-05-31 ENCOUNTER — HOSPITAL ENCOUNTER (OUTPATIENT)
Age: 47
Setting detail: SPECIMEN
Discharge: HOME | End: 2022-05-31

## 2022-05-31 LAB
T3 FREE: 4.21 PG/ML (ref 2.02–4.43)
THYROXINE, FREE: 0.92 NG/DL (ref 0.93–1.7)
TSH SERPL DL<=0.05 MIU/L-ACNC: 3.26 UIU/ML (ref 0.3–5)

## 2022-06-01 LAB
THYROGLOBULIN AB: 91 IU/ML (ref 0–40)
THYROID PEROXIDASE (TPO) AB: 925 IU/ML (ref 0–25)

## 2022-10-17 ENCOUNTER — OFFICE VISIT (OUTPATIENT)
Dept: FAMILY MEDICINE CLINIC | Age: 47
End: 2022-10-17
Payer: MEDICARE

## 2022-10-17 VITALS
WEIGHT: 157 LBS | BODY MASS INDEX: 27.38 KG/M2 | SYSTOLIC BLOOD PRESSURE: 110 MMHG | HEART RATE: 65 BPM | DIASTOLIC BLOOD PRESSURE: 80 MMHG | OXYGEN SATURATION: 97 %

## 2022-10-17 DIAGNOSIS — M06.39 RHEUMATOID NODULE OF MULTIPLE SITES (HCC): Primary | ICD-10-CM

## 2022-10-17 DIAGNOSIS — Z12.12 ENCOUNTER FOR SCREENING FOR COLORECTAL MALIGNANT NEOPLASM IN HIGH RISK PATIENT: ICD-10-CM

## 2022-10-17 DIAGNOSIS — Z12.11 ENCOUNTER FOR SCREENING FOR COLORECTAL MALIGNANT NEOPLASM: ICD-10-CM

## 2022-10-17 DIAGNOSIS — M79.672 LEFT FOOT PAIN: ICD-10-CM

## 2022-10-17 DIAGNOSIS — Z12.12 ENCOUNTER FOR SCREENING FOR COLORECTAL MALIGNANT NEOPLASM: ICD-10-CM

## 2022-10-17 DIAGNOSIS — Z12.11 ENCOUNTER FOR SCREENING FOR COLORECTAL MALIGNANT NEOPLASM IN HIGH RISK PATIENT: ICD-10-CM

## 2022-10-17 DIAGNOSIS — Z91.89 ENCOUNTER FOR SCREENING FOR COLORECTAL MALIGNANT NEOPLASM IN HIGH RISK PATIENT: ICD-10-CM

## 2022-10-17 DIAGNOSIS — R76.8 ELEVATED RHEUMATOID FACTOR: ICD-10-CM

## 2022-10-17 PROCEDURE — G8484 FLU IMMUNIZE NO ADMIN: HCPCS | Performed by: NURSE PRACTITIONER

## 2022-10-17 PROCEDURE — 4004F PT TOBACCO SCREEN RCVD TLK: CPT | Performed by: NURSE PRACTITIONER

## 2022-10-17 PROCEDURE — G8419 CALC BMI OUT NRM PARAM NOF/U: HCPCS | Performed by: NURSE PRACTITIONER

## 2022-10-17 PROCEDURE — G8427 DOCREV CUR MEDS BY ELIG CLIN: HCPCS | Performed by: NURSE PRACTITIONER

## 2022-10-17 PROCEDURE — 99213 OFFICE O/P EST LOW 20 MIN: CPT | Performed by: NURSE PRACTITIONER

## 2022-10-17 SDOH — ECONOMIC STABILITY: FOOD INSECURITY: WITHIN THE PAST 12 MONTHS, THE FOOD YOU BOUGHT JUST DIDN'T LAST AND YOU DIDN'T HAVE MONEY TO GET MORE.: NEVER TRUE

## 2022-10-17 SDOH — ECONOMIC STABILITY: FOOD INSECURITY: WITHIN THE PAST 12 MONTHS, YOU WORRIED THAT YOUR FOOD WOULD RUN OUT BEFORE YOU GOT MONEY TO BUY MORE.: NEVER TRUE

## 2022-10-17 ASSESSMENT — PATIENT HEALTH QUESTIONNAIRE - PHQ9
SUM OF ALL RESPONSES TO PHQ QUESTIONS 1-9: 0
10. IF YOU CHECKED OFF ANY PROBLEMS, HOW DIFFICULT HAVE THESE PROBLEMS MADE IT FOR YOU TO DO YOUR WORK, TAKE CARE OF THINGS AT HOME, OR GET ALONG WITH OTHER PEOPLE: 0
5. POOR APPETITE OR OVEREATING: 0
2. FEELING DOWN, DEPRESSED OR HOPELESS: 0
6. FEELING BAD ABOUT YOURSELF - OR THAT YOU ARE A FAILURE OR HAVE LET YOURSELF OR YOUR FAMILY DOWN: 0
1. LITTLE INTEREST OR PLEASURE IN DOING THINGS: 0
3. TROUBLE FALLING OR STAYING ASLEEP: 0
9. THOUGHTS THAT YOU WOULD BE BETTER OFF DEAD, OR OF HURTING YOURSELF: 0
4. FEELING TIRED OR HAVING LITTLE ENERGY: 0
SUM OF ALL RESPONSES TO PHQ QUESTIONS 1-9: 0
SUM OF ALL RESPONSES TO PHQ9 QUESTIONS 1 & 2: 0
SUM OF ALL RESPONSES TO PHQ QUESTIONS 1-9: 0
8. MOVING OR SPEAKING SO SLOWLY THAT OTHER PEOPLE COULD HAVE NOTICED. OR THE OPPOSITE, BEING SO FIGETY OR RESTLESS THAT YOU HAVE BEEN MOVING AROUND A LOT MORE THAN USUAL: 0
7. TROUBLE CONCENTRATING ON THINGS, SUCH AS READING THE NEWSPAPER OR WATCHING TELEVISION: 0
SUM OF ALL RESPONSES TO PHQ QUESTIONS 1-9: 0

## 2022-10-17 ASSESSMENT — SOCIAL DETERMINANTS OF HEALTH (SDOH): HOW HARD IS IT FOR YOU TO PAY FOR THE VERY BASICS LIKE FOOD, HOUSING, MEDICAL CARE, AND HEATING?: NOT HARD AT ALL

## 2022-10-17 ASSESSMENT — ENCOUNTER SYMPTOMS
SHORTNESS OF BREATH: 0
COUGH: 0

## 2022-10-17 NOTE — PROGRESS NOTES
Patient is present for a new referral  Patient states she was given a referral in the past for rheumatology but never went  Patient states she has been having b/l knee pain  States this has been going on for 8 months

## 2022-10-17 NOTE — PROGRESS NOTES
Joel Monsalve (:  1975) is a 41 y.o. female,Established patient, here for evaluation of the following chief complaint(s):  Referral - General         ASSESSMENT/PLAN:  1. Rheumatoid nodule of multiple sites [facility])  -     Eris Costa MD, Rheumatology, [place]  2. Elevated rheumatoid factor  -     [facility] - Annita Larkin MD, Rheumatology, [place]  3. Encounter for screening for colorectal malignant neoplasm in high risk patient  4. Encounter for screening for colorectal malignant neoplasm  -     Fecal DNA Colorectal cancer screening (Cologuard)  5. Left foot pain  -     External Referral To Podiatry      No follow-ups on file. Subjective   SUBJECTIVE/OBJECTIVE:  HPI  Feels her bone in left great toe is growing weird after a bunionectomy 5 years ago. Would like referral to see podiatry who did surgery. History of polyarthralgia, nodules to certain joints, positive rheumatoid factor, and positive family history of rheumatoid arthritis. Referral was given in 2017 for rheumatology but patient did not go. Would like new referral today. Review of Systems   Constitutional:  Negative for chills and fever. Respiratory:  Negative for cough and shortness of breath. Cardiovascular:  Negative for chest pain, palpitations and leg swelling. Musculoskeletal:  Positive for arthralgias. Objective   Vitals:    10/17/22 0921   BP: 110/80   Pulse: 65   SpO2: 97%     Wt Readings from Last 3 Encounters:   10/17/22 157 lb (71.2 kg)   22 162 lb 11.2 oz (73.8 kg)   22 161 lb 6.4 oz (73.2 kg)       Physical Exam  Constitutional:       Appearance: She is well-developed. HENT:      Right Ear: External ear normal.      Left Ear: External ear normal.      Nose: Nose normal.   Cardiovascular:      Rate and Rhythm: Normal rate and regular rhythm. Heart sounds: Normal heart sounds, S1 normal and S2 normal.   Pulmonary:      Effort: Pulmonary effort is normal. No respiratory distress. Breath sounds: Normal breath sounds. Musculoskeletal:         General: No deformity. Normal range of motion. Cervical back: Full passive range of motion without pain and normal range of motion. Skin:     General: Skin is warm and dry. Neurological:      Mental Status: She is alert and oriented to person, place, and time. An electronic signature was used to authenticate this note.     --Mert Tapia, CYNTHIA - CNP

## 2022-10-20 ENCOUNTER — HOSPITAL ENCOUNTER (OUTPATIENT)
Age: 47
Setting detail: SPECIMEN
Discharge: HOME | End: 2022-10-20

## 2022-10-20 DIAGNOSIS — E06.9 THYROIDITIS: ICD-10-CM

## 2022-10-20 DIAGNOSIS — D75.1 POLYCYTHEMIA, SECONDARY: ICD-10-CM

## 2022-10-20 DIAGNOSIS — E83.19 IRON OVERLOAD: ICD-10-CM

## 2022-10-20 DIAGNOSIS — D75.1 POLYCYTHEMIA: ICD-10-CM

## 2022-10-20 DIAGNOSIS — D75.1 POLYCYTHEMIA: Primary | ICD-10-CM

## 2022-10-20 LAB
ABSOLUTE EOS #: 0.1 K/UL (ref 0–0.44)
ABSOLUTE IMMATURE GRANULOCYTE: <0.03 K/UL (ref 0–0.3)
ABSOLUTE LYMPH #: 2.36 K/UL (ref 1.1–3.7)
ABSOLUTE MONO #: 0.7 K/UL (ref 0.1–1.2)
ALBUMIN SERPL-MCNC: 4.3 G/DL (ref 3.5–5.2)
ALBUMIN/GLOBULIN RATIO: 1.4 (ref 1–2.5)
ALP BLD-CCNC: 49 U/L (ref 35–104)
ALT SERPL-CCNC: 11 U/L (ref 5–33)
ANION GAP SERPL CALCULATED.3IONS-SCNC: 10 MMOL/L (ref 9–17)
AST SERPL-CCNC: 19 U/L
BASOPHILS # BLD: 1 % (ref 0–2)
BASOPHILS ABSOLUTE: 0.04 K/UL (ref 0–0.2)
BILIRUB SERPL-MCNC: 0.4 MG/DL (ref 0.3–1.2)
BUN BLDV-MCNC: 13 MG/DL (ref 6–20)
CALCIUM SERPL-MCNC: 9.2 MG/DL (ref 8.6–10.4)
CHLORIDE BLD-SCNC: 100 MMOL/L (ref 98–107)
CO2: 25 MMOL/L (ref 20–31)
CREAT SERPL-MCNC: 0.74 MG/DL (ref 0.5–0.9)
EOSINOPHILS RELATIVE PERCENT: 1 % (ref 1–4)
FERRITIN: 74 NG/ML (ref 13–150)
GFR SERPL CREATININE-BSD FRML MDRD: >60 ML/MIN/1.73M2
GLUCOSE BLD-MCNC: 79 MG/DL (ref 70–99)
HCT VFR BLD CALC: 46.8 % (ref 36.3–47.1)
HEMOGLOBIN: 15.3 G/DL (ref 11.9–15.1)
IMMATURE GRANULOCYTES: 0 %
LYMPHOCYTES # BLD: 30 % (ref 24–43)
MCH RBC QN AUTO: 31.3 PG (ref 25.2–33.5)
MCHC RBC AUTO-ENTMCNC: 32.7 G/DL (ref 28.4–34.8)
MCV RBC AUTO: 95.7 FL (ref 82.6–102.9)
MONOCYTES # BLD: 9 % (ref 3–12)
NRBC AUTOMATED: 0 PER 100 WBC
PDW BLD-RTO: 13.9 % (ref 11.8–14.4)
PLATELET # BLD: 201 K/UL (ref 138–453)
PMV BLD AUTO: 12.3 FL (ref 8.1–13.5)
POTASSIUM SERPL-SCNC: 4 MMOL/L (ref 3.7–5.3)
RBC # BLD: 4.89 M/UL (ref 3.95–5.11)
SEG NEUTROPHILS: 59 % (ref 36–65)
SEGMENTED NEUTROPHILS ABSOLUTE COUNT: 4.76 K/UL (ref 1.5–8.1)
SODIUM BLD-SCNC: 135 MMOL/L (ref 135–144)
T3 FREE: 3.65 PG/ML (ref 2.02–4.43)
THYROXINE, FREE: 0.91 NG/DL (ref 0.93–1.7)
TOTAL PROTEIN: 7.4 G/DL (ref 6.4–8.3)
TSH SERPL DL<=0.05 MIU/L-ACNC: 5.39 UIU/ML (ref 0.3–5)
TSH SERPL DL<=0.05 MIU/L-ACNC: 5.39 UIU/ML (ref 0.3–5)
WBC # BLD: 8 K/UL (ref 3.5–11.3)

## 2022-10-24 ENCOUNTER — TELEPHONE (OUTPATIENT)
Dept: FAMILY MEDICINE CLINIC | Age: 47
End: 2022-10-24

## 2022-10-24 NOTE — TELEPHONE ENCOUNTER
----- Message from Junie Belle sent at 10/24/2022 10:56 AM EDT -----  Subject: Message to Provider    QUESTIONS  Information for Provider? Please fax over referral and records to   Rheumatology. ---------------------------------------------------------------------------  --------------  Lucy Weinstein EQTQ  [phone]; OK to leave message on [employer], OK to respond with   electronic message via [employer] portal (only for patients who have   registered [employer] account)  ---------------------------------------------------------------------------  --------------  SCRIPT ANSWERS  Relationship to Patient?  Self

## 2022-10-27 ENCOUNTER — TELEPHONE (OUTPATIENT)
Dept: ONCOLOGY | Age: 47
End: 2022-10-27

## 2022-10-27 NOTE — TELEPHONE ENCOUNTER
LEFT VM TO SCHEDULE 1 YR F/U WITH LABS PRIOR WITH DR Chapincito Castillo. DUE IN MID April (LABS CDP CMP DIAMANTE)

## 2022-10-29 LAB — NONINV COLON CA DNA+OCC BLD SCRN STL QL: NEGATIVE

## 2023-01-23 ENCOUNTER — HOSPITAL ENCOUNTER (OUTPATIENT)
Dept: GENERAL RADIOLOGY | Age: 48
Discharge: HOME | End: 2023-01-25
Payer: MEDICARE

## 2023-01-23 ENCOUNTER — HOSPITAL ENCOUNTER (OUTPATIENT)
Age: 48
Discharge: HOME | End: 2023-01-25
Payer: MEDICARE

## 2023-01-23 DIAGNOSIS — M25.872 SESAMOIDITIS OF LEFT FOOT: ICD-10-CM

## 2023-01-23 PROCEDURE — 73630 X-RAY EXAM OF FOOT: CPT

## 2023-04-03 DIAGNOSIS — D75.1 POLYCYTHEMIA: Primary | ICD-10-CM

## 2023-04-17 ENCOUNTER — TELEPHONE (OUTPATIENT)
Dept: ONCOLOGY | Age: 48
End: 2023-04-17

## 2023-04-17 ENCOUNTER — OFFICE VISIT (OUTPATIENT)
Dept: ONCOLOGY | Age: 48
End: 2023-04-17
Payer: MEDICAID

## 2023-04-17 VITALS
RESPIRATION RATE: 16 BRPM | WEIGHT: 158.6 LBS | BODY MASS INDEX: 27.65 KG/M2 | HEART RATE: 80 BPM | DIASTOLIC BLOOD PRESSURE: 75 MMHG | SYSTOLIC BLOOD PRESSURE: 126 MMHG | TEMPERATURE: 97.7 F

## 2023-04-17 DIAGNOSIS — E83.19 IRON OVERLOAD: Primary | ICD-10-CM

## 2023-04-17 DIAGNOSIS — D75.1 POLYCYTHEMIA: ICD-10-CM

## 2023-04-17 PROCEDURE — 99214 OFFICE O/P EST MOD 30 MIN: CPT | Performed by: INTERNAL MEDICINE

## 2023-04-17 PROCEDURE — 99211 OFF/OP EST MAY X REQ PHY/QHP: CPT | Performed by: INTERNAL MEDICINE

## 2023-04-17 RX ORDER — 0.9 % SODIUM CHLORIDE 0.9 %
250 INTRAVENOUS SOLUTION INTRAVENOUS ONCE
OUTPATIENT
Start: 2023-04-17 | End: 2023-04-17

## 2023-04-17 NOTE — PROGRESS NOTES
11/23/2021    FERRITIN 85 04/10/2023     Erythropoietin 4 - 27 mU/mL 6          REVIEW OF RADIOLOGICAL RESULTS:     IMPRESSION:   Polycythemia, likely secondary, stopped smoking  Goiter  Smoking addiction, finally quit smoking, resumed  History of hep C  Work-up suggest H 63D homozygous mutation    PLAN:   We reviewed her lab work, her HGB and ferritin are normalized following phlebotomy and controlling of her diet. She has undergone treatment for hepatitis C which led to improvement in her iron storage as her liver is less inflamed  I counseled her on smoking cessation and I discussed option for smoking cessation program, she declined at this time. Return in 1 year, sooner if clinically indicated.        Gissel Moran MD  Hematologist/Medical Oncologist  [facility] hematology oncology physicians

## 2023-04-17 NOTE — TELEPHONE ENCOUNTER
[facility] ARRIVES AMBULATORY FOR MD VISIT  DR Moshe Ybarra IN TO SEE PATIENT  ORDERS RECEIVED  RV 1 YEAR WITH CBC FERRITIN  PHLEBOTOMY X1 UNIT TODAY  PHLEBOTOMY TODAY, PT TAKEN TO BACK  LABS CDP FERRITIN DUE 04/20/23, ORDERS GIVEN TO PT  MD VISIT 04/22/23 @3:15PM  AVS PRINTED AND GIVEN TO PATIENT WITH INSTRUCTIONS  PATIENT DISCHARGED TO [address] [place] [address]

## 2023-04-18 ENCOUNTER — HOSPITAL ENCOUNTER (OUTPATIENT)
Dept: INFUSION THERAPY | Facility: MEDICAL CENTER | Age: 48
Discharge: HOME | End: 2023-04-18
Payer: MEDICAID

## 2023-04-18 VITALS
HEART RATE: 73 BPM | DIASTOLIC BLOOD PRESSURE: 61 MMHG | TEMPERATURE: 98.4 F | RESPIRATION RATE: 16 BRPM | SYSTOLIC BLOOD PRESSURE: 96 MMHG

## 2023-04-18 PROCEDURE — 99195 PHLEBOTOMY: CPT

## 2023-04-25 ENCOUNTER — HOSPITAL ENCOUNTER (OUTPATIENT)
Age: 48
Setting detail: SPECIMEN
Discharge: HOME | End: 2023-04-25

## 2023-04-25 DIAGNOSIS — E83.19 IRON OVERLOAD: ICD-10-CM

## 2023-04-25 DIAGNOSIS — D75.1 POLYCYTHEMIA: ICD-10-CM

## 2023-04-25 LAB
ABSOLUTE EOS #: 0.13 K/UL (ref 0–0.44)
ABSOLUTE IMMATURE GRANULOCYTE: <0.03 K/UL (ref 0–0.3)
ABSOLUTE LYMPH #: 2.89 K/UL (ref 1.1–3.7)
ABSOLUTE MONO #: 0.74 K/UL (ref 0.1–1.2)
BASOPHILS # BLD: 1 % (ref 0–2)
BASOPHILS ABSOLUTE: 0.05 K/UL (ref 0–0.2)
EOSINOPHILS RELATIVE PERCENT: 2 % (ref 1–4)
FERRITIN SERPL-MCNC: 48 NG/ML (ref 13–150)
HCT VFR BLD AUTO: 45.2 % (ref 36.3–47.1)
HGB BLD-MCNC: 14.4 G/DL (ref 11.9–15.1)
IMMATURE GRANULOCYTES: 0 %
LYMPHOCYTES # BLD: 34 % (ref 24–43)
MCH RBC QN AUTO: 30.8 PG (ref 25.2–33.5)
MCHC RBC AUTO-ENTMCNC: 31.9 G/DL (ref 28.4–34.8)
MCV RBC AUTO: 96.6 FL (ref 82.6–102.9)
MONOCYTES # BLD: 9 % (ref 3–12)
NRBC AUTOMATED: 0 PER 100 WBC
PDW BLD-RTO: 13.4 % (ref 11.8–14.4)
PLATELET # BLD AUTO: 226 K/UL (ref 138–453)
PMV BLD AUTO: 12.6 FL (ref 8.1–13.5)
RBC # BLD: 4.68 M/UL (ref 3.95–5.11)
SEG NEUTROPHILS: 54 % (ref 36–65)
SEGMENTED NEUTROPHILS ABSOLUTE COUNT: 4.63 K/UL (ref 1.5–8.1)
T3FREE SERPL-MCNC: 3.3 PG/ML (ref 2.02–4.43)
T4 FREE SERPL-MCNC: 0.9 NG/DL (ref 0.9–1.7)
TSH SERPL-ACNC: 4.93 UIU/ML (ref 0.3–5)
WBC # BLD AUTO: 8.5 K/UL (ref 3.5–11.3)

## 2023-05-08 ENCOUNTER — OFFICE VISIT (OUTPATIENT)
Dept: FAMILY MEDICINE CLINIC | Age: 48
End: 2023-05-08
Payer: MEDICAID

## 2023-05-08 ENCOUNTER — HOSPITAL ENCOUNTER (OUTPATIENT)
Age: 48
Setting detail: SPECIMEN
Discharge: HOME | End: 2023-05-08

## 2023-05-08 VITALS
BODY MASS INDEX: 26.98 KG/M2 | SYSTOLIC BLOOD PRESSURE: 108 MMHG | WEIGHT: 158 LBS | RESPIRATION RATE: 20 BRPM | OXYGEN SATURATION: 97 % | HEIGHT: 64 IN | DIASTOLIC BLOOD PRESSURE: 64 MMHG | HEART RATE: 86 BPM

## 2023-05-08 DIAGNOSIS — B37.0 ORAL CANDIDIASIS: Primary | ICD-10-CM

## 2023-05-08 DIAGNOSIS — J02.9 PHARYNGITIS, UNSPECIFIED ETIOLOGY: ICD-10-CM

## 2023-05-08 DIAGNOSIS — E53.8 B12 DEFICIENCY: ICD-10-CM

## 2023-05-08 DIAGNOSIS — E55.9 VITAMIN D DEFICIENCY: ICD-10-CM

## 2023-05-08 LAB
25(OH)D3 SERPL-MCNC: 32.1 NG/ML
FOLATE SERPL-MCNC: 11.7 NG/ML
S PYO AG THROAT QL: NORMAL
VIT B12 SERPL-MCNC: 355 PG/ML (ref 232–1245)

## 2023-05-08 PROCEDURE — 87880 STREP A ASSAY W/OPTIC: CPT | Performed by: NURSE PRACTITIONER

## 2023-05-08 PROCEDURE — 99214 OFFICE O/P EST MOD 30 MIN: CPT | Performed by: NURSE PRACTITIONER

## 2023-05-08 RX ORDER — OMEPRAZOLE 20 MG/1
20 CAPSULE, DELAYED RELEASE ORAL
Qty: 30 CAPSULE | Refills: 0 | Status: SHIPPED | OUTPATIENT
Start: 2023-05-08

## 2023-05-08 RX ORDER — IBUPROFEN 800 MG/1
800 TABLET ORAL 3 TIMES DAILY PRN
COMMUNITY
Start: 2023-05-03

## 2023-05-08 SDOH — ECONOMIC STABILITY: HOUSING INSECURITY
IN THE LAST 12 MONTHS, WAS THERE A TIME WHEN YOU DID NOT HAVE A STEADY PLACE TO SLEEP OR SLEPT IN A SHELTER (INCLUDING NOW)?: NO

## 2023-05-08 SDOH — ECONOMIC STABILITY: FOOD INSECURITY: WITHIN THE PAST 12 MONTHS, YOU WORRIED THAT YOUR FOOD WOULD RUN OUT BEFORE YOU GOT MONEY TO BUY MORE.: NEVER TRUE

## 2023-05-08 SDOH — ECONOMIC STABILITY: FOOD INSECURITY: WITHIN THE PAST 12 MONTHS, THE FOOD YOU BOUGHT JUST DIDN'T LAST AND YOU DIDN'T HAVE MONEY TO GET MORE.: NEVER TRUE

## 2023-05-08 SDOH — ECONOMIC STABILITY: INCOME INSECURITY: HOW HARD IS IT FOR YOU TO PAY FOR THE VERY BASICS LIKE FOOD, HOUSING, MEDICAL CARE, AND HEATING?: NOT HARD AT ALL

## 2023-05-08 ASSESSMENT — PATIENT HEALTH QUESTIONNAIRE - PHQ9
7. TROUBLE CONCENTRATING ON THINGS, SUCH AS READING THE NEWSPAPER OR WATCHING TELEVISION: 0
5. POOR APPETITE OR OVEREATING: 0
1. LITTLE INTEREST OR PLEASURE IN DOING THINGS: 0
9. THOUGHTS THAT YOU WOULD BE BETTER OFF DEAD, OR OF HURTING YOURSELF: 0
SUM OF ALL RESPONSES TO PHQ QUESTIONS 1-9: 0
6. FEELING BAD ABOUT YOURSELF - OR THAT YOU ARE A FAILURE OR HAVE LET YOURSELF OR YOUR FAMILY DOWN: 0
SUM OF ALL RESPONSES TO PHQ QUESTIONS 1-9: 0
SUM OF ALL RESPONSES TO PHQ9 QUESTIONS 1 & 2: 0
4. FEELING TIRED OR HAVING LITTLE ENERGY: 0
2. FEELING DOWN, DEPRESSED OR HOPELESS: 0
8. MOVING OR SPEAKING SO SLOWLY THAT OTHER PEOPLE COULD HAVE NOTICED. OR THE OPPOSITE, BEING SO FIGETY OR RESTLESS THAT YOU HAVE BEEN MOVING AROUND A LOT MORE THAN USUAL: 0
3. TROUBLE FALLING OR STAYING ASLEEP: 0
10. IF YOU CHECKED OFF ANY PROBLEMS, HOW DIFFICULT HAVE THESE PROBLEMS MADE IT FOR YOU TO DO YOUR WORK, TAKE CARE OF THINGS AT HOME, OR GET ALONG WITH OTHER PEOPLE: 0

## 2023-05-08 ASSESSMENT — ENCOUNTER SYMPTOMS
BACK PAIN: 0
NAUSEA: 0
DIARRHEA: 0
VOMITING: 0
SINUS PRESSURE: 0
CONSTIPATION: 0
CHEST TIGHTNESS: 0
TROUBLE SWALLOWING: 1
SINUS PAIN: 0
VOICE CHANGE: 0
ABDOMINAL PAIN: 0
SHORTNESS OF BREATH: 0
COLOR CHANGE: 0
SORE THROAT: 1
EYE PAIN: 0

## 2023-05-08 NOTE — PROGRESS NOTES
Faith Ureña (:  1975) is a 41 y.o. female,Established patient, here for evaluation of the following chief complaint(s):  Oral Pain (Red raised burning bumps on back of tongue. /States her mouth, tongue, throat everything is swollen and red. /) and Health Maintenance (Depression screen completed. /SDOH completed. /)         ASSESSMENT/PLAN:  1. Oral candidiasis  Comments:  Rx for nystatin with instruction for use. Follow up with dentist.   Orders:  -     nystatin (MYCOSTATIN) [id number] UNIT/ML suspension; Take 5 mLs by mouth 4 times daily for 10 days, Oral, 4 TIMES DAILY Starting Mon 2023, Until Thu 2023, For 10 days, Disp-200 mL, R-0, Normal  2. Pharyngitis, unspecified etiology  Comments:  Rx for throat culture, prilosec 20mg ER and referral to ENT. Follow up pending results. Orders:  -     Culture, Throat; Future  -     POCT rapid strep A  -     [facility] - Nick Cooley MD, Otolaryngology, [place]  -     omeprazole (PRILOSEC) 20 MG delayed release capsule; Take 1 capsule by mouth every morning (before breakfast), Disp-30 capsule, R-0Normal  3. B12 deficiency  -     Vitamin B12 & Folate; Future  4. Vitamin D deficiency  -     Vitamin D 25 Hydroxy; Future      Return in about 6 months (around 2023) for physical.         Subjective   SUBJECTIVE/OBJECTIVE:  Presents for acute visit    Reports sore throat with red raised bumps on the back of her tongue since . Endorses some left ear pain, unsure if its because of referred dental pain or throat pain. Unsure if she has yeast infection in throat. Had filling done through dentist a few weeks ago. Was given Rx for motrin 800mg for this. Currently taking this TID - helps with sore throat.   No fever, chills, recent URI   No difficulty swallowing solids/liquids - just hurts to swallow because of soreness   Woke up with canker sore today to left lower inner gumline   Appointment with dentist later today             Review of Systems

## 2023-05-10 DIAGNOSIS — E53.8 B12 DEFICIENCY: Primary | ICD-10-CM

## 2023-05-10 LAB
MICROORGANISM SPEC CULT: NORMAL
SPECIMEN DESCRIPTION: NORMAL

## 2023-11-07 ENCOUNTER — HOSPITAL ENCOUNTER (OUTPATIENT)
Age: 48
Setting detail: SPECIMEN
Discharge: HOME | End: 2023-11-07

## 2023-11-07 ENCOUNTER — OFFICE VISIT (OUTPATIENT)
Dept: FAMILY MEDICINE CLINIC | Age: 48
End: 2023-11-07
Payer: COMMERCIAL

## 2023-11-07 VITALS
HEART RATE: 76 BPM | WEIGHT: 156 LBS | OXYGEN SATURATION: 98 % | DIASTOLIC BLOOD PRESSURE: 60 MMHG | BODY MASS INDEX: 27.2 KG/M2 | SYSTOLIC BLOOD PRESSURE: 110 MMHG

## 2023-11-07 DIAGNOSIS — E53.8 B12 DEFICIENCY: ICD-10-CM

## 2023-11-07 DIAGNOSIS — R63.1 POLYDIPSIA: Primary | ICD-10-CM

## 2023-11-07 DIAGNOSIS — R63.1 POLYDIPSIA: ICD-10-CM

## 2023-11-07 DIAGNOSIS — Z00.00 ENCOUNTER FOR WELL ADULT EXAM WITHOUT ABNORMAL FINDINGS: ICD-10-CM

## 2023-11-07 LAB
ALBUMIN SERPL-MCNC: 4.3 G/DL (ref 3.5–5.2)
ALBUMIN/GLOB SERPL: 1.3 {RATIO} (ref 1–2.5)
ALP SERPL-CCNC: 54 U/L (ref 35–104)
ALT SERPL-CCNC: 9 U/L (ref 5–33)
ANION GAP SERPL CALCULATED.3IONS-SCNC: 13 MMOL/L (ref 9–17)
AST SERPL-CCNC: 19 U/L
BILIRUB SERPL-MCNC: 0.4 MG/DL (ref 0.3–1.2)
BUN SERPL-MCNC: 8 MG/DL (ref 6–20)
CALCIUM SERPL-MCNC: 9.4 MG/DL (ref 8.6–10.4)
CHLORIDE SERPL-SCNC: 106 MMOL/L (ref 98–107)
CO2 SERPL-SCNC: 24 MMOL/L (ref 20–31)
CREAT SERPL-MCNC: 0.6 MG/DL (ref 0.5–0.9)
ERYTHROCYTE [DISTWIDTH] IN BLOOD BY AUTOMATED COUNT: 13.3 % (ref 11.8–14.4)
ERYTHROCYTE [SEDIMENTATION RATE] IN BLOOD BY PHOTOMETRIC METHOD: 14 MM/HR (ref 0–20)
EST. AVERAGE GLUCOSE BLD GHB EST-MCNC: 105 MG/DL
FOLATE SERPL-MCNC: 13 NG/ML
GFR SERPL CREATININE-BSD FRML MDRD: >60 ML/MIN/1.73M2
GLUCOSE SERPL-MCNC: 81 MG/DL (ref 70–99)
HBA1C MFR BLD: 5.3 % (ref 4–6)
HCT VFR BLD AUTO: 48.4 % (ref 36.3–47.1)
HGB BLD-MCNC: 15.7 G/DL (ref 11.9–15.1)
MCH RBC QN AUTO: 31 PG (ref 25.2–33.5)
MCHC RBC AUTO-ENTMCNC: 32.4 G/DL (ref 28.4–34.8)
MCV RBC AUTO: 95.5 FL (ref 82.6–102.9)
NRBC BLD-RTO: 0 PER 100 WBC
PLATELET # BLD AUTO: ABNORMAL K/UL (ref 138–453)
PLATELET, FLUORESCENCE: 183 K/UL (ref 138–453)
PLATELETS.RETICULATED NFR BLD AUTO: 10.1 % (ref 1.1–10.3)
POTASSIUM SERPL-SCNC: 4.2 MMOL/L (ref 3.7–5.3)
PROT SERPL-MCNC: 7.6 G/DL (ref 6.4–8.3)
RBC # BLD AUTO: 5.07 M/UL (ref 3.95–5.11)
RHEUMATOID FACT SER NEPH-ACNC: 19.5 IU/ML
SODIUM SERPL-SCNC: 143 MMOL/L (ref 135–144)
TSH SERPL DL<=0.05 MIU/L-ACNC: 3.58 UIU/ML (ref 0.3–5)
VIT B12 SERPL-MCNC: 741 PG/ML (ref 232–1245)
WBC OTHER # BLD: 8.6 K/UL (ref 3.5–11.3)

## 2023-11-07 PROCEDURE — 99396 PREV VISIT EST AGE 40-64: CPT | Performed by: NURSE PRACTITIONER

## 2023-11-07 ASSESSMENT — ENCOUNTER SYMPTOMS
CHEST TIGHTNESS: 0
EYE DISCHARGE: 0
CONSTIPATION: 0
DIARRHEA: 0
COUGH: 0
SHORTNESS OF BREATH: 0
RHINORRHEA: 0
SINUS PRESSURE: 0
BLOOD IN STOOL: 0
ABDOMINAL PAIN: 0

## 2023-11-07 NOTE — PROGRESS NOTES
Well Adult Note  Name: Soco Villanueva Date: 2023   MRN: [id number] Sex: Female   Age: 57 y.o. Ethnicity: Non- / Non    : 1975 Race: White (non-)      Matthew Gillespie is here for well adult exam.  History:history of positive RF- never went to rheum. C/o constantly being thirsty since April. Was tx for oral candidiasis in may, states the burning went away but the thirst remains. Review of Systems   Constitutional:  Negative for activity change, appetite change, chills, fatigue and fever. HENT:  Negative for congestion, ear pain, rhinorrhea and sinus pressure. Eyes:  Negative for discharge and visual disturbance. Respiratory:  Negative for cough, chest tightness and shortness of breath. Cardiovascular:  Negative for chest pain, palpitations and leg swelling. Gastrointestinal:  Negative for abdominal pain, blood in stool, constipation and diarrhea. Endocrine: Negative for cold intolerance and heat intolerance. Genitourinary:  Negative for difficulty urinating and hematuria. Musculoskeletal:  Negative for arthralgias and myalgias. Skin:  Negative for rash. Neurological:  Negative for dizziness, light-headedness and headaches. Psychiatric/Behavioral:  Negative for dysphoric mood and self-injury. No Known Allergies      Prior to Visit Medications    Medication Sig Taking?  Authorizing Provider   thyroid (ARMOUR THYROID) 30 MG tablet Take 1 tablet by mouth daily Yes CYNTHIA Rosales NP   ibuprofen (ADVIL;MOTRIN) 800 MG tablet Take 1 tablet by mouth 3 times daily as needed  Patient not taking: Reported on 2023  Provider, MD Martita   omeprazole (PRILOSEC) 20 MG delayed release capsule Take 1 capsule by mouth every morning (before breakfast)  Patient not taking: Reported on 2023  CYNTHIA Rosales NP         Past Medical History:   Diagnosis Date    Depression 2014    Elevated liver enzymes 2017    Hepatitis C

## 2023-11-09 LAB
ENA SS-A IGG SER QL: <0.3 U/ML
ENA SS-B IGG SER IA-ACNC: <0.3 U/ML

## 2023-11-17 DIAGNOSIS — R76.8 ELEVATED RHEUMATOID FACTOR: Primary | ICD-10-CM

## 2024-04-11 ENCOUNTER — HOSPITAL ENCOUNTER (OUTPATIENT)
Age: 49
Setting detail: SPECIMEN
Discharge: HOME | End: 2024-04-11

## 2024-04-11 DIAGNOSIS — D75.1 POLYCYTHEMIA: Primary | ICD-10-CM

## 2024-04-11 DIAGNOSIS — D75.1 POLYCYTHEMIA: ICD-10-CM

## 2024-04-11 LAB
BASOPHILS # BLD: 0.06 K/UL (ref 0–0.2)
BASOPHILS NFR BLD: 1 % (ref 0–2)
EOSINOPHIL # BLD: 0.12 K/UL (ref 0–0.44)
EOSINOPHILS RELATIVE PERCENT: 1 % (ref 1–4)
ERYTHROCYTE [DISTWIDTH] IN BLOOD BY AUTOMATED COUNT: 13.5 % (ref 11.8–14.4)
FERRITIN SERPL-MCNC: 71 NG/ML (ref 13–150)
HCT VFR BLD AUTO: 47.5 % (ref 36.3–47.1)
HGB BLD-MCNC: 15.3 G/DL (ref 11.9–15.1)
IMM GRANULOCYTES # BLD AUTO: <0.03 K/UL (ref 0–0.3)
IMM GRANULOCYTES NFR BLD: 0 %
LYMPHOCYTES NFR BLD: 2.74 K/UL (ref 1.1–3.7)
LYMPHOCYTES RELATIVE PERCENT: 31 % (ref 24–43)
MCH RBC QN AUTO: 30.6 PG (ref 25.2–33.5)
MCHC RBC AUTO-ENTMCNC: 32.2 G/DL (ref 28.4–34.8)
MCV RBC AUTO: 95 FL (ref 82.6–102.9)
MONOCYTES NFR BLD: 0.75 K/UL (ref 0.1–1.2)
MONOCYTES NFR BLD: 9 % (ref 3–12)
NEUTROPHILS NFR BLD: 58 % (ref 36–65)
NEUTS SEG NFR BLD: 5.09 K/UL (ref 1.5–8.1)
NRBC BLD-RTO: 0 PER 100 WBC
PLATELET # BLD AUTO: 199 K/UL (ref 138–453)
PMV BLD AUTO: 12.3 FL (ref 8.1–13.5)
RBC # BLD AUTO: 5 M/UL (ref 3.95–5.11)
T4 FREE SERPL-MCNC: 0.9 NG/DL (ref 0.92–1.68)
TSH SERPL DL<=0.05 MIU/L-ACNC: 6.5 UIU/ML (ref 0.27–4.2)
WBC OTHER # BLD: 8.8 K/UL (ref 3.5–11.3)

## 2024-04-12 LAB
THYROGLOBULIN AB: 122 IU/ML (ref 0–40)
THYROPEROXIDASE AB SERPL IA-ACNC: 1161 IU/ML (ref 0–25)

## 2024-04-22 ENCOUNTER — TELEPHONE (OUTPATIENT)
Dept: ONCOLOGY | Age: 49
End: 2024-04-22

## 2024-04-22 ENCOUNTER — OFFICE VISIT (OUTPATIENT)
Dept: ONCOLOGY | Age: 49
End: 2024-04-22
Payer: COMMERCIAL

## 2024-04-22 VITALS
HEART RATE: 69 BPM | TEMPERATURE: 98.2 F | WEIGHT: 155 LBS | RESPIRATION RATE: 16 BRPM | SYSTOLIC BLOOD PRESSURE: 100 MMHG | BODY MASS INDEX: 27.03 KG/M2 | DIASTOLIC BLOOD PRESSURE: 66 MMHG

## 2024-04-22 DIAGNOSIS — D75.1 POLYCYTHEMIA: Primary | ICD-10-CM

## 2024-04-22 PROCEDURE — 99211 OFF/OP EST MAY X REQ PHY/QHP: CPT | Performed by: INTERNAL MEDICINE

## 2024-04-22 PROCEDURE — 99213 OFFICE O/P EST LOW 20 MIN: CPT | Performed by: INTERNAL MEDICINE

## 2024-04-22 NOTE — TELEPHONE ENCOUNTER
[facility] HERE FOR MD VISIT  RV 1 YR W/ CBC  LAB CBC 4/2025  MD VISIT 4/2025  AVS PRINTED W/ INSTRUCTIONS AND GIVEN  TO PT ON EXIT

## 2024-04-22 NOTE — PROGRESS NOTES
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 female who is referred to us for polycythemia. She denies any previous history of polycythemia or hemachromatosis. She does smoke a pack daily and has for the last 30 years. She has goiter and reports medication makes her sleepy and she hasn't been taking. She complains of throat swelling and soreness. She has been diagnosed with hepatitis C, underwent treatment. She denies history of illegal drug use.      INTERIM HISTORY: She comes in today for a follow-up for hemachromatosis. She continues to feel fatigued   She unfortunately continues to smoke     PAST MEDICAL HISTORY: has a past medical history of Depression, Elevated liver enzymes, Hepatitis C, and Wrist fracture.    PAST SURGICAL HISTORY: has a past surgical history that includes  section ( & ).     CURRENT MEDICATIONS:  has a current medication list which includes the following prescription(s): thyroid.    ALLERGIES:  has No Known Allergies.    FAMILY HISTORY: Negative for any hematological or oncological conditions.     SOCIAL HISTORY:  reports that she has been smoking cigarettes. She has a 25.0 pack-year smoking history. She has never used smokeless tobacco. She reports current alcohol use. She reports that she does not use drugs.    REVIEW OF SYSTEMS:   General: No fever or night sweats. Weight is stable. +fatigue  ENT: No double or blurred vision, no tinnitus or hearing problem, no dysphagia  Respiratory: No chest pain, no shortness of breath, no cough or hemoptysis.  Cardiovascular: Denies chest pain, PND or orthopnea. No L E swelling or palpitations.  Gastrointestinal: No nausea or vomiting, abdominal pain, diarrhea or constipation.

## 2024-06-17 ENCOUNTER — HOSPITAL ENCOUNTER (OUTPATIENT)
Age: 49
Setting detail: SPECIMEN
Discharge: HOME | End: 2024-06-17

## 2024-06-17 LAB
T4 FREE SERPL-MCNC: 0.9 NG/DL (ref 0.92–1.68)
TSH SERPL DL<=0.05 MIU/L-ACNC: 2.33 UIU/ML (ref 0.27–4.2)

## 2024-08-22 ENCOUNTER — TELEMEDICINE (OUTPATIENT)
Dept: FAMILY MEDICINE CLINIC | Age: 49
End: 2024-08-22
Payer: COMMERCIAL

## 2024-08-22 DIAGNOSIS — H10.9 BACTERIAL CONJUNCTIVITIS: Primary | ICD-10-CM

## 2024-08-22 PROCEDURE — 99213 OFFICE O/P EST LOW 20 MIN: CPT | Performed by: NURSE PRACTITIONER

## 2024-08-22 RX ORDER — POLYMYXIN B SULFATE AND TRIMETHOPRIM 1; 10000 MG/ML; [USP'U]/ML
1 SOLUTION OPHTHALMIC EVERY 6 HOURS
Qty: 2 ML | Refills: 0 | Status: SHIPPED | OUTPATIENT
Start: 2024-08-22 | End: 2024-09-01

## 2024-08-22 SDOH — ECONOMIC STABILITY: FOOD INSECURITY: WITHIN THE PAST 12 MONTHS, YOU WORRIED THAT YOUR FOOD WOULD RUN OUT BEFORE YOU GOT MONEY TO BUY MORE.: NEVER TRUE

## 2024-08-22 SDOH — ECONOMIC STABILITY: INCOME INSECURITY: HOW HARD IS IT FOR YOU TO PAY FOR THE VERY BASICS LIKE FOOD, HOUSING, MEDICAL CARE, AND HEATING?: NOT HARD AT ALL

## 2024-08-22 SDOH — ECONOMIC STABILITY: FOOD INSECURITY: WITHIN THE PAST 12 MONTHS, THE FOOD YOU BOUGHT JUST DIDN'T LAST AND YOU DIDN'T HAVE MONEY TO GET MORE.: NEVER TRUE

## 2024-08-22 ASSESSMENT — ENCOUNTER SYMPTOMS
SHORTNESS OF BREATH: 0
COUGH: 0

## 2024-08-22 ASSESSMENT — PATIENT HEALTH QUESTIONNAIRE - PHQ9
SUM OF ALL RESPONSES TO PHQ9 QUESTIONS 1 & 2: 0
2. FEELING DOWN, DEPRESSED OR HOPELESS: NOT AT ALL
1. LITTLE INTEREST OR PLEASURE IN DOING THINGS: NOT AT ALL
SUM OF ALL RESPONSES TO PHQ QUESTIONS 1-9: 0

## 2024-08-22 NOTE — PROGRESS NOTES
Patient is present complaining of eye swelling, eye redness x2 days  States her eyes were glued shut  States no pain, but does have itching and burning sensation  States it is mostly in the right eye, states the left eye itches  States she has been using eye drops and using a warm compress, states these have helped    Declined mammogram at this time

## 2024-08-22 NOTE — PROGRESS NOTES
Darlene Yap, was evaluated through a synchronous (real-time) audio-video encounter. The patient (or guardian if applicable) is aware that this is a billable service, which includes applicable co-pays. This Virtual Visit was conducted with patient's (and/or legal guardian's) consent. Patient identification was verified, and a caregiver was present when appropriate.   The patient was located at Home: [address] [facility] [address]  Provider was located at Home (Appt Dept State): [place]  Confirm you are appropriately licensed, registered, or certified to deliver care in the state where the patient is located as indicated above. If you are not or unsure, please re-schedule the visit: Yes, I confirm.     Darlene Yap (:  1975) is a Established patient, presenting virtually for evaluation of the following:      Below is the assessment and plan developed based on review of pertinent history, physical exam, labs, studies, and medications.     Assessment & Plan  Bacterial conjunctivitis   Uncontrolled, changes made today: add polytrim    Orders:    trimethoprim-polymyxin b (POLYTRIM) 26020-2.1 UNIT/ML-% ophthalmic solution; Place 1 drop into the right eye every 6 hours for 10 days      No follow-ups on file.       Subjective   Eye Problem   Pertinent negatives include no fever.     Patient is present complaining of eye swelling, eye redness x2 days  States her eyes were glued shut  States no pain, but does have itching and burning sensation  States it is mostly in the right eye, states the left eye itches  States she has been using eye drops and using a warm compress, states these have helped    Declined mammogram at this time  Review of Systems   Constitutional:  Negative for chills and fever.   Respiratory:  Negative for cough and shortness of breath.    Cardiovascular:  Negative for chest pain, palpitations and leg swelling.          Objective   Patient-Reported Vitals  No data recorded     Physical

## 2024-08-23 ENCOUNTER — TELEPHONE (OUTPATIENT)
Dept: FAMILY MEDICINE CLINIC | Age: 49
End: 2024-08-23

## 2024-08-23 NOTE — TELEPHONE ENCOUNTER
[facility] called stating for the eye drops they only come in a 10ml bottle.  Verbal given to change.

## 2024-09-12 ENCOUNTER — TELEPHONE (OUTPATIENT)
Dept: ONCOLOGY | Age: 49
End: 2024-09-12

## 2025-04-17 DIAGNOSIS — D75.1 POLYCYTHEMIA: Primary | ICD-10-CM

## 2025-04-21 ENCOUNTER — HOSPITAL ENCOUNTER (OUTPATIENT)
Age: 50
Setting detail: SPECIMEN
Discharge: HOME | End: 2025-04-21

## 2025-04-21 DIAGNOSIS — D75.1 POLYCYTHEMIA: ICD-10-CM

## 2025-04-21 LAB
BASOPHILS # BLD: 0.06 K/UL (ref 0–0.2)
BASOPHILS NFR BLD: 1 % (ref 0–2)
EOSINOPHIL # BLD: 0.15 K/UL (ref 0–0.44)
EOSINOPHILS RELATIVE PERCENT: 2 % (ref 1–4)
ERYTHROCYTE [DISTWIDTH] IN BLOOD BY AUTOMATED COUNT: 13.2 % (ref 11.8–14.4)
HCT VFR BLD AUTO: 46 % (ref 36.3–47.1)
HGB BLD-MCNC: 14.6 G/DL (ref 11.9–15.1)
IMM GRANULOCYTES # BLD AUTO: <0.03 K/UL (ref 0–0.3)
IMM GRANULOCYTES NFR BLD: 0 %
LYMPHOCYTES NFR BLD: 3.32 K/UL (ref 1.1–3.7)
LYMPHOCYTES RELATIVE PERCENT: 38 % (ref 24–43)
MCH RBC QN AUTO: 30.2 PG (ref 25.2–33.5)
MCHC RBC AUTO-ENTMCNC: 31.7 G/DL (ref 28.4–34.8)
MCV RBC AUTO: 95.2 FL (ref 82.6–102.9)
MONOCYTES NFR BLD: 0.8 K/UL (ref 0.1–1.2)
MONOCYTES NFR BLD: 9 % (ref 3–12)
NEUTROPHILS NFR BLD: 50 % (ref 36–65)
NEUTS SEG NFR BLD: 4.34 K/UL (ref 1.5–8.1)
NRBC BLD-RTO: 0 PER 100 WBC
PLATELET # BLD AUTO: 183 K/UL (ref 138–453)
PMV BLD AUTO: 13 FL (ref 8.1–13.5)
RBC # BLD AUTO: 4.83 M/UL (ref 3.95–5.11)
WBC OTHER # BLD: 8.7 K/UL (ref 3.5–11.3)